# Patient Record
Sex: FEMALE | Race: WHITE | Employment: OTHER | URBAN - METROPOLITAN AREA
[De-identification: names, ages, dates, MRNs, and addresses within clinical notes are randomized per-mention and may not be internally consistent; named-entity substitution may affect disease eponyms.]

---

## 2017-01-05 ENCOUNTER — OFFICE VISIT (OUTPATIENT)
Dept: INTERNAL MEDICINE CLINIC | Age: 72
End: 2017-01-05

## 2017-01-05 VITALS
HEART RATE: 66 BPM | HEIGHT: 65 IN | RESPIRATION RATE: 12 BRPM | BODY MASS INDEX: 22.33 KG/M2 | OXYGEN SATURATION: 97 % | TEMPERATURE: 98.7 F | WEIGHT: 134 LBS

## 2017-01-05 DIAGNOSIS — M77.11 RIGHT TENNIS ELBOW: Primary | ICD-10-CM

## 2017-01-05 RX ORDER — IBUPROFEN 200 MG
400 TABLET ORAL
COMMUNITY
Start: 2017-01-05 | End: 2018-10-08 | Stop reason: ALTCHOICE

## 2017-01-05 NOTE — MR AVS SNAPSHOT
Visit Information Date & Time Provider Department Dept. Phone Encounter #  
 1/5/2017  4:00 PM Luis E Umaña MD [address] Internal Medicine [phone] [id number] Your Appointments 10/5/2017  1:30 PM  
Medicare Physical with Luis E Umaña MD  
[address] Internal [facility] Appt Note: CPE - wellness [address] [facility] [address] [place] [address] [facility] [address]  
[facility] [address] [facility] [address] Upcoming Health Maintenance Date Due COLONOSCOPY 1/1/2016 MEDICARE YEARLY EXAM 10/4/2017 GLAUCOMA SCREENING Q2Y 5/31/2018 BREAST CANCER SCRN MAMMOGRAM 11/21/2018 DTaP/Tdap/Td series (2 - Td) 7/18/2022 Allergies as of 1/5/2017  Review Complete On: 1/5/2017 By: Benjie Shearer LPN No Known Allergies Current Immunizations  Reviewed on 9/28/2015 Name Date Influenza High Dose Vaccine PF 10/17/2016, 9/28/2015, 9/30/2014 Influenza Vaccine Whole 11/1/2009 Pneumococcal Conjugate (PCV-13) 8/6/2015 Pneumococcal Vaccine (Unspecified Type) 6/3/2009 TDAP Vaccine 7/18/2012 Zoster 5/22/2007 Not reviewed this visit You Were Diagnosed With   
  
 Codes Comments Right tennis elbow    -  Primary ICD-10-CM: M77.11 ICD-9-CM: 726.32 Vitals Pulse Temp Resp Height(growth percentile) Weight(growth percentile) SpO2  
 66 98.7 °F (37.1 °C) (Oral) 12 5' 5\" (1.651 m) 134 lb (60.8 kg) 97% BMI OB Status Smoking Status 22.3 kg/m2 Hysterectomy Former Smoker Vitals History BMI and BSA Data Body Mass Index Body Surface Area  
 22.3 kg/m 2 1.67 m 2 Preferred Pharmacy Pharmacy Name Phone [facility] # [address] [facility] [phone] Your Updated Medication List  
  
   
This list is accurate as of: 1/5/17  4:46 PM.  Always use your most recent med list. ADVIL 200 mg tablet Generic drug:  ibuprofen Take 2 Tabs by mouth every eight (8) hours as needed for Pain. DHA ALGAL-900 PO Take  by mouth. fexofenadine 180 mg tablet Commonly known as:  Arty Most Take 1 Tab by mouth daily. metroNIDAZOLE 0.75 % topical cream  
Commonly known as:  METROCREAM  
Apply  to affected area two (2) times a day. Use a thin layer to affected areas after washing  
  
 valACYclovir 1 gram tablet Commonly known as:  VALTREX Take 1 Tab by mouth daily. TAKE 1 TABLET DAILY AS DIRECTED  Indications: GENITAL HERPES SIMPLEX  
  
 VITAMIN D3 1,000 unit Cap Generic drug:  cholecalciferol Take  by mouth. Patient Instructions Tennis Elbow: Care Instructions Your Care Instructions Tennis elbow is soreness or pain on the outer part of the elbow. The pain occurs when the tendon is stretched and becomes irritated by repeated twisting of the hand, wrist, and forearm. A tendon is a tough tissue that connects muscle to bone. This injury is common in tennis players. But you also can get it from many activities that work the same muscles. Examples include gardening, painting, and using tools. Tennis elbow usually heals with rest and treatment at home. Follow-up care is a key part of your treatment and safety. Be sure to make and go to all appointments, and call your doctor if you are having problems. It's also a good idea to know your test results and keep a list of the medicines you take. How can you care for yourself at home? · Rest your fingers, wrist, and forearm. Try to stop or reduce any activity that causes elbow pain. You may have to rest your arm for weeks to months. Follow your doctor's directions for how long to rest. 
· Put ice or a cold pack on your elbow for 10 to 20 minutes at a time. Try to do this every 1 to 2 hours for the next 3 days (when you are awake) or until the swelling goes down. Put a thin cloth between the ice and your skin. · If your doctor gave you a brace or splint, use it as directed. A \"counterforce\" brace is a strap around your forearm, just below your elbow. It may ease the pressure on the tendon and spread force throughout your arm. · Prop up your elbow on pillows to help reduce swelling. · Follow your doctor's or physical therapist's directions for exercise. · Return to your usual activities slowly. · Try to prevent the problem. Learn the best techniques for your sport. For example, make sure the  on your tennis racquet is not too big for your hand. Try not to hit a tennis ball late in your swing. · Think about asking your employer about new ways of doing your job if your elbow pain is caused by something you do at work. Medicines · Be safe with medicines. Read and follow all instructions on the label. ¨ If the doctor gave you a prescription medicine for pain, take it as prescribed. ¨ If you are not taking a prescription pain medicine, ask your doctor if you can take an over-the-counter medicine. When should you call for help? Call your doctor now or seek immediate medical care if: 
· Your pain is worse. · You cannot bend your elbow normally. · Your arm or hand is cool or pale or changes color. · You have tingling, weakness, or numbness in your hand and fingers. Watch closely for changes in your health, and be sure to contact your doctor if: 
· You have work problems caused by your elbow pain. · Your pain is not better after 2 weeks. Where can you learn more? Go to http://ludwin-leonard.info/. Enter 0699 [id number] in the search box to learn more about \"Tennis Elbow: Care Instructions. \" Current as of: May 23, 2016 Content Version: 11.1 © 0261-5985 [employer]. Care instructions adapted under license by [employer] (which disclaims liability or warranty for this information).  If you have questions about a medical condition or this instruction, always ask your healthcare professional. [place] [address] any warranty or liability for your use of this information. Tennis Elbow: Exercises Your Care Instructions Here are some examples of typical rehabilitation exercises for your condition. Start each exercise slowly. Ease off the exercise if you start to have pain. Your doctor or physical therapist will tell you when you can start these exercises and which ones will work best for you. How to do the exercises Wrist flexor stretch 1. Extend your arm in front of you with your palm up. 2. Bend your wrist, pointing your hand toward the floor. 3. With your other hand, gently bend your wrist farther until you feel a mild to moderate stretch in your forearm. 4. Hold for at least 15 to 30 seconds. Repeat 2 to 4 times. Wrist extensor stretch Repeat steps 1 to 4 of the stretch above but begin with your extended hand palm down. Ball or sock squeeze 1. Hold a tennis ball (or a rolled-up sock) in your hand. 2. Make a fist around the ball (or sock) and squeeze. 3. Hold for about 6 seconds, and then relax for up to 10 seconds. 4. Repeat 8 to 12 times. 5. Switch the ball (or sock) to your other hand and do 8 to 12 times. Wrist deviation 1. Sit so that your arm is supported but your hand hangs off the edge of a flat surface, such as a table. 2. Hold your hand out like you are shaking hands with someone. 3. Move your hand up and down. 4. Repeat this motion 8 to 12 times. 5. Switch arms. 6. Try to do this exercise twice with each hand. Wrist curls 1. Place your forearm on a table with your hand hanging over the edge of the table, palm up. 2. Place a 1- to 2-pound weight in your hand. This may be a dumbbell, a can of food, or a filled water bottle. 3. Slowly raise and lower the weight while keeping your forearm on the table and your palm facing up. 4. Repeat this motion 8 to 12 times. 5. Switch arms, and do steps 1 through 4. 
6. Repeat with your hand facing down toward the floor. Switch arms. Biceps curls 1. Sit leaning forward with your legs slightly spread and your left hand on your left thigh. 2. Place your right elbow on your right thigh, and hold the weight with your forearm horizontal. 
3. Slowly curl the weight up and toward your chest. 
4. Repeat this motion 8 to 12 times. 5. Switch arms, and do steps 1 through 4. Follow-up care is a key part of your treatment and safety. Be sure to make and go to all appointments, and call your doctor if you are having problems. It's also a good idea to know your test results and keep a list of the medicines you take. Where can you learn more? Go to http://ludwin-leonard.info/. Enter G917 in the search box to learn more about \"Tennis Elbow: Exercises. \" Current as of: May 23, 2016 Content Version: 11.1 © 2243-0438 [employer]. Care instructions adapted under license by [employer] (which disclaims liability or warranty for this information). If you have questions about a medical condition or this instruction, always ask your healthcare professional. [place] [address] any warranty or liability for your use of this information. Introducing hospitals & HEALTH SERVICES! Dear Maura De Guzman: Thank you for requesting a Ruckus account. Our records indicate that you already have an active Ruckus account. You can access your account anytime at [email or web address] Did you know that you can access your hospital and ER discharge instructions at any time in Ruckus? You can also review all of your test results from your hospital stay or ER visit. Additional Information If you have questions, please visit the Frequently Asked Questions section of the Ruckus website at [email or web address] Remember, Ruckus is NOT to be used for urgent needs.  For medical emergencies, dial 911. Now available from your iPhone and Android! Please provide this summary of care documentation to your next provider. Your primary care clinician is listed as Colin [phone] If you have any questions after today's visit, please call [phone].

## 2017-01-05 NOTE — PATIENT INSTRUCTIONS
Tennis Elbow: Care Instructions  Your Care Instructions  Tennis elbow is soreness or pain on the outer part of the elbow. The pain occurs when the tendon is stretched and becomes irritated by repeated twisting of the hand, wrist, and forearm. A tendon is a tough tissue that connects muscle to bone. This injury is common in tennis players. But you also can get it from many activities that work the same muscles. Examples include gardening, painting, and using tools. Tennis elbow usually heals with rest and treatment at home. Follow-up care is a key part of your treatment and safety. Be sure to make and go to all appointments, and call your doctor if you are having problems. It's also a good idea to know your test results and keep a list of the medicines you take. How can you care for yourself at home? · Rest your fingers, wrist, and forearm. Try to stop or reduce any activity that causes elbow pain. You may have to rest your arm for weeks to months. Follow your doctor's directions for how long to rest.  · Put ice or a cold pack on your elbow for 10 to 20 minutes at a time. Try to do this every 1 to 2 hours for the next 3 days (when you are awake) or until the swelling goes down. Put a thin cloth between the ice and your skin. · If your doctor gave you a brace or splint, use it as directed. A \"counterforce\" brace is a strap around your forearm, just below your elbow. It may ease the pressure on the tendon and spread force throughout your arm. · Prop up your elbow on pillows to help reduce swelling. · Follow your doctor's or physical therapist's directions for exercise. · Return to your usual activities slowly. · Try to prevent the problem. Learn the best techniques for your sport. For example, make sure the  on your tennis racquet is not too big for your hand. Try not to hit a tennis ball late in your swing.   · Think about asking your employer about new ways of doing your job if your elbow pain is caused by something you do at work. Medicines  · Be safe with medicines. Read and follow all instructions on the label. ¨ If the doctor gave you a prescription medicine for pain, take it as prescribed. ¨ If you are not taking a prescription pain medicine, ask your doctor if you can take an over-the-counter medicine. When should you call for help? Call your doctor now or seek immediate medical care if:  · Your pain is worse. · You cannot bend your elbow normally. · Your arm or hand is cool or pale or changes color. · You have tingling, weakness, or numbness in your hand and fingers. Watch closely for changes in your health, and be sure to contact your doctor if:  · You have work problems caused by your elbow pain. · Your pain is not better after 2 weeks. Where can you learn more? Go to http://ludwin-leonard.info/. Enter [id number] [id number] in the search box to learn more about \"Tennis Elbow: Care Instructions. \"  Current as of: May 23, 2016  Content Version: 11.1  © 9461-6548 M:Metrics. Care instructions adapted under license by [employer] (which disclaims liability or warranty for this information). If you have questions about a medical condition or this instruction, always ask your healthcare professional. [address] any warranty or liability for your use of this information. Tennis Elbow: Exercises  Your Care Instructions  Here are some examples of typical rehabilitation exercises for your condition. Start each exercise slowly. Ease off the exercise if you start to have pain. Your doctor or physical therapist will tell you when you can start these exercises and which ones will work best for you. How to do the exercises  Wrist flexor stretch    1. Extend your arm in front of you with your palm up. 2. Bend your wrist, pointing your hand toward the floor.   3. With your other hand, gently bend your wrist farther until you feel a mild to moderate stretch in your forearm. 4. Hold for at least 15 to 30 seconds. Repeat 2 to 4 times. Wrist extensor stretch    Repeat steps 1 to 4 of the stretch above but begin with your extended hand palm down. Ball or sock squeeze    1. Hold a tennis ball (or a rolled-up sock) in your hand. 2. Make a fist around the ball (or sock) and squeeze. 3. Hold for about 6 seconds, and then relax for up to 10 seconds. 4. Repeat 8 to 12 times. 5. Switch the ball (or sock) to your other hand and do 8 to 12 times. Wrist deviation    1. Sit so that your arm is supported but your hand hangs off the edge of a flat surface, such as a table. 2. Hold your hand out like you are shaking hands with someone. 3. Move your hand up and down. 4. Repeat this motion 8 to 12 times. 5. Switch arms. 6. Try to do this exercise twice with each hand. Wrist curls    1. Place your forearm on a table with your hand hanging over the edge of the table, palm up. 2. Place a 1- to 2-pound weight in your hand. This may be a dumbbell, a can of food, or a filled water bottle. 3. Slowly raise and lower the weight while keeping your forearm on the table and your palm facing up. 4. Repeat this motion 8 to 12 times. 5. Switch arms, and do steps 1 through 4.  6. Repeat with your hand facing down toward the floor. Switch arms. Biceps curls    1. Sit leaning forward with your legs slightly spread and your left hand on your left thigh. 2. Place your right elbow on your right thigh, and hold the weight with your forearm horizontal.  3. Slowly curl the weight up and toward your chest.  4. Repeat this motion 8 to 12 times. 5. Switch arms, and do steps 1 through 4. Follow-up care is a key part of your treatment and safety. Be sure to make and go to all appointments, and call your doctor if you are having problems. It's also a good idea to know your test results and keep a list of the medicines you take. Where can you learn more?   Go to http://ludwin-leonard.info/. Enter S273 in the search box to learn more about \"Tennis Elbow: Exercises. \"  Current as of: May 23, 2016  Content Version: 11.1  © 1262-9595 [employer]em [employer]. Care instructions adapted under license by [employer] (which disclaims liability or warranty for this information). If you have questions about a medical condition or this instruction, always ask your healthcare professional. [place] [address] any warranty or liability for your use of this information.

## 2017-01-06 NOTE — PROGRESS NOTES
HISTORY OF PRESENT ILLNESS  Jordyn Flores is a 66 y.o. female. HPI  Presents for 5 weeks of pain in the right elbow. Has been doing a lot of yard work and blowing leaves. Has taken one advil per day and had ordered a stretching bar. No swelling or redness. No numbness or tingling or weakness. No other joint aches. Past Medical History   Diagnosis Date    Cancer [facility]) '78 & '04     melanoma times 2 - RESECTED    Endometriosis     HSV-2 infection      Past Surgical History   Procedure Laterality Date    Hx hysterectomy      Pr manipulatn shldr jt w anesthesia      Xr dexa bone density study axial  04.2009    Hx orthopaedic       FROZEN SHOULDER    Hx orthopaedic  10/2012     Foot surgery     Current Outpatient Prescriptions on File Prior to Visit   Medication Sig Dispense Refill    valACYclovir (VALTREX) 1 gram tablet Take 1 Tab by mouth daily. TAKE 1 TABLET DAILY AS DIRECTED  Indications: GENITAL HERPES SIMPLEX 60 Tab 0    Cholecalciferol, Vitamin D3, (VITAMIN D3) 1,000 unit cap Take  by mouth.  DOCOSAHEXANOIC ACID (DHA ALGAL-900 PO) Take  by mouth.  fexofenadine (ALLEGRA) 180 mg tablet Take 1 Tab by mouth daily. 90 Tab 3    metronidazole (METROCREAM) 0.75 % topical cream Apply  to affected area two (2) times a day. Use a thin layer to affected areas after washing        No current facility-administered medications on file prior to visit. ROS  Per HPI  Physical Exam  Physical Examination: General appearance - alert, well appearing, and in no distress  Chest - clear to auscultation, no wheezes, rales or rhonchi, symmetric air entry  Heart - normal rate, regular rhythm, normal S1, S2, no murmurs, rubs, clicks or gallops  Musculoskeletal - abnormal exam of right elbow with tenderness over the lateral epicondyle. Normal ROM and no redness or warmth.    Extremities - peripheral pulses normal, no pedal edema, no clubbing or cyanosis    ASSESSMENT and PLAN  Tennis elbow - will use more advil as well as stretches and a brace and let me know if not improving. Plan -   Orders Placed This Encounter    ibuprofen (ADVIL) 200 mg tablet     Sig: Take 2 Tabs by mouth every eight (8) hours as needed for Pain. Advised her to call back or return to office if symptoms worsen/change/persist.  Discussed expected course/resolution/complications of diagnosis in detail with patient. Medication risks/benefits/costs/interactions/alternatives discussed with patient. She was given an after visit summary which includes diagnoses, current medications, & vitals. She expressed understanding with the diagnosis and plan.

## 2017-02-14 RX ORDER — VALACYCLOVIR HYDROCHLORIDE 1 G/1
1000 TABLET, FILM COATED ORAL DAILY
Qty: 90 TAB | Refills: 1 | Status: SHIPPED | OUTPATIENT
Start: 2017-02-14 | End: 2018-07-23 | Stop reason: SDUPTHER

## 2017-02-14 NOTE — TELEPHONE ENCOUNTER
Orders Placed This Encounter    valACYclovir (VALTREX) 1 gram tablet     Sig: Take 1 Tab by mouth daily. TAKE 1 TABLET DAILY AS DIRECTED  Indications: GENITAL HERPES SIMPLEX     Dispense:  90 Tab     Refill:  1     The above medication refills were approved via verbal order by Dr. Dylan Yanez III.

## 2017-07-11 ENCOUNTER — TELEPHONE (OUTPATIENT)
Dept: INTERNAL MEDICINE CLINIC | Age: 72
End: 2017-07-11

## 2017-07-11 DIAGNOSIS — Z12.11 COLON CANCER SCREENING: Primary | ICD-10-CM

## 2017-07-11 NOTE — TELEPHONE ENCOUNTER
Referral to [facility] for colo cancer screening - however ins. Will not show [employer] on referral because we do not have her new insurance information on file with our office. Will not effect scheduling as they will obtain copies of her insurance cards when she sees them. Referral faxed to Dr. [facility] office with confirmation [facility]. Attempted to reach patient, unsuccessful. Left message to return call.

## 2017-07-11 NOTE — TELEPHONE ENCOUNTER
Patient was given a referral to gastro for a colonoscopy last October. She never had that done and states she was never contacted to schedule it. She would like a new order with current date and that shows her current insurance, which is [employer]. Please let her know if she should come by to pick that up or if you will mail it to her. Also, she needs to know if someone will contact her from there.

## 2017-10-05 ENCOUNTER — OFFICE VISIT (OUTPATIENT)
Dept: INTERNAL MEDICINE CLINIC | Age: 72
End: 2017-10-05

## 2017-10-05 ENCOUNTER — HOSPITAL ENCOUNTER (OUTPATIENT)
Dept: GENERAL RADIOLOGY | Age: 72
Discharge: HOME | End: 2017-10-05
Payer: COMMERCIAL

## 2017-10-05 VITALS
DIASTOLIC BLOOD PRESSURE: 78 MMHG | WEIGHT: 128.4 LBS | OXYGEN SATURATION: 99 % | BODY MASS INDEX: 21.39 KG/M2 | TEMPERATURE: 98 F | SYSTOLIC BLOOD PRESSURE: 140 MMHG | HEART RATE: 73 BPM | RESPIRATION RATE: 18 BRPM | HEIGHT: 65 IN

## 2017-10-05 DIAGNOSIS — E55.9 VITAMIN D DEFICIENCY: ICD-10-CM

## 2017-10-05 DIAGNOSIS — E78.2 MIXED HYPERLIPIDEMIA: ICD-10-CM

## 2017-10-05 DIAGNOSIS — Z71.89 ADVANCED CARE PLANNING/COUNSELING DISCUSSION: ICD-10-CM

## 2017-10-05 DIAGNOSIS — M16.0 BILATERAL HIP JOINT ARTHRITIS: ICD-10-CM

## 2017-10-05 DIAGNOSIS — Z00.00 MEDICARE ANNUAL WELLNESS VISIT, SUBSEQUENT: Primary | ICD-10-CM

## 2017-10-05 DIAGNOSIS — Z13.31 SCREENING FOR DEPRESSION: ICD-10-CM

## 2017-10-05 DIAGNOSIS — Z13.39 SCREENING FOR ALCOHOLISM: ICD-10-CM

## 2017-10-05 DIAGNOSIS — M16.0 PRIMARY OSTEOARTHRITIS OF BOTH HIPS: ICD-10-CM

## 2017-10-05 PROCEDURE — 73521 X-RAY EXAM HIPS BI 2 VIEWS: CPT

## 2017-10-05 NOTE — PROGRESS NOTES
HPI:  Wm Proctor is a 74y.o. year old female who is here for a routine visit:    Here for her Medicare wellness as well as her routine follow-up visit. She has been feeling reasonably well. She has has noted some increasing pain in her hips particularly in the posterior buttocks and in the groin. Seem to be most notable when she is standing on her feet or turning her legs. She is also had some soreness in her shoulders and decreased range of motion as well. She also had questions about whether she should get another shingles vaccine. Her last shingles vaccine was given when she was taking Valtrex. She is otherwise seeing her gynecologist regularly and getting mammograms done regularly as well. Patient was seen in consultation with the nurse navigator history was reviewed and agree with the assessment of the nurse navigator. Past Medical History:   Diagnosis Date    Cancer [facility]) '66 & '04    melanoma times 2 - RESECTED    Endometriosis     HSV-2 infection        Past Surgical History:   Procedure Laterality Date    HX HYSTERECTOMY      HX ORTHOPAEDIC      FROZEN SHOULDER    HX ORTHOPAEDIC  10/2012    Foot surgery    HI BETTYULAJUDY RAMIREZ JT W ANESTHESIA      XR DEXA BONE DENSITY STUDY AXIAL  04.2009       Prior to Admission medications    Medication Sig Start Date End Date Taking? Authorizing Provider   TURMERIC, BULK, by Does Not Apply route. Yes Historical Provider   valACYclovir (VALTREX) 1 gram tablet Take 1 Tab by mouth daily. TAKE 1 TABLET DAILY AS DIRECTED  Indications: GENITAL HERPES SIMPLEX  Patient taking differently: Take 500 mg by mouth daily. TAKE 1 TABLET DAILY AS DIRECTED  Indications: genital herpes simplex 2/14/17  Yes Brandi Case III, MD   Cholecalciferol, Vitamin D3, (VITAMIN D3) 1,000 unit cap Take  by mouth. Yes Historical Provider   fexofenadine (ALLEGRA) 180 mg tablet Take 1 Tab by mouth daily.  7/13/11  Yes Johnathan Figueroa MD   metronidazole (METROCREAM) 0.75 % topical cream Apply  to affected area two (2) times a day. Use a thin layer to affected areas after washing    Yes Historical Provider   ibuprofen (ADVIL) 200 mg tablet Take 2 Tabs by mouth every eight (8) hours as needed for Pain. 1/5/17   Kevin Busch III, MD   DOCOSAHEXANOIC ACID (DHA ALGAL-900 PO) Take  by mouth. Historical Provider       Social History     Social History    Marital status: UNKNOWN     Spouse name: N/A    Number of children: N/A    Years of education: N/A     Occupational History    Not on file.      Social History Main Topics    Smoking status: Former Smoker     Packs/day: 0.25     Years: 11.00     Quit date: 2/26/1973    Smokeless tobacco: Never Used    Alcohol use No    Drug use: No    Sexual activity: Not on file     Other Topics Concern    Not on file     Social History Narrative          ROS  Per HPI    Visit Vitals    /78 (BP 1 Location: Left arm, BP Patient Position: Sitting)    Pulse 73    Temp 98 °F (36.7 °C) (Oral)    Resp 18    Ht 5' 4.5\" (1.638 m)    Wt 128 lb 6.4 oz (58.2 kg)    SpO2 99%    BMI 21.7 kg/m2         Physical Exam   Physical Examination: General appearance - alert, well appearing, and in no distress  Eyes - pupils equal and reactive, extraocular eye movements intact  Ears - bilateral TM's and external ear canals normal  Nose - normal and patent, no erythema, discharge or polyps  Mouth - mucous membranes moist, pharynx normal without lesions  Neck - supple, no significant adenopathy  Lymphatics - no palpable lymphadenopathy, no hepatosplenomegaly  Chest - clear to auscultation, no wheezes, rales or rhonchi, symmetric air entry  Heart - normal rate, regular rhythm, normal S1, S2, no murmurs, rubs, clicks or gallops  Abdomen - soft, nontender, nondistended, no masses or organomegaly  Back exam - full range of motion, no tenderness, palpable spasm or pain on motion  Neurological - alert, oriented, normal speech, no focal findings or movement disorder noted  Musculoskeletal - no joint tenderness, deformity or swelling, there is painful range of motion in the hips right greater than left particularly with external rotation. No effusion or erythema. Negative straight leg raising. Extremities - peripheral pulses normal, no pedal edema, no clubbing or cyanosis      Assessment/Plan:  Diagnoses and all orders for this visit:    1. Medicare annual wellness visit, subsequent    2. Advanced care planning/counseling discussion    3. Screening for depression    4. Screening for alcoholism    5. Mixed hyperlipidemia continue to work on diet and exercise and recheck her lipids. -     CBC WITH AUTOMATED DIFF  -     LIPID PANEL  -     METABOLIC PANEL, COMPREHENSIVE  -     TSH RFX ON ABNORMAL TO FREE T4  -     UA/M W/RFLX CULTURE, ROUTINE    6. Vitamin D deficiency check vitamin D level to be sure this is repleted. Continue her current supplement. -     VITAMIN D, 25 HYDROXY    7. Bilateral hip joint arthritis likely related to osteoarthritis in the hips. Will check x-rays and consider an orthopedic evaluation pending those results. -     XR HIP LT W OR WO PELV 2-3 VWS; Future  -     XR HIP RT W OR WO PELV 2-3 VWS; Future       Follow-up Disposition: 12 months and as needed. Advised her to call back or return to office if symptoms worsen/change/persist.  Discussed expected course/resolution/complications of diagnosis in detail with patient. Medication risks/benefits/costs/interactions/alternatives discussed with patient. She was given an after visit summary which includes diagnoses, current medications, & vitals. She expressed understanding with the diagnosis and plan.

## 2017-10-05 NOTE — PATIENT INSTRUCTIONS
Today you had a Medicare Wellness Visit. During this visit, we developed and/or updated your personalized health plan to prevent disease and disability based on your current health and risk factors. Please schedule an appt around this time next year so we can continue to keep you on the right path to living a healthy lifestyle. Schedule of Personalized Health Plan    The best way to stay healthy is to live a healthy lifestyle. A healthy lifestyle includes regular exercise, eating a well-balanced diet, keeping a healthy weight and not smoking. Regular physical exams and screening tests are another important way to take care of yourself. Preventive exams provided by health care providers can find health problems early when treatment works best and can keep you from getting certain diseases or illnesses. Preventive services include exams, lab tests, screenings, shots, monitoring and information to help you take care of your own health. All people over 65 should have a pneumonia shot. Pneumonia shots are usually only needed once in a lifetime unless your doctor decides differently. All people over 65 should have a yearly flu shot. People over 65 are at medium to high risk for Hepatitis B. Three shots are needed for complete protection. For additional information, please discuss with physician. In addition to your physical exam, some screening tests are recommended:    Bone mass measurement (dexa scan) is recommended every  two years. Diabetes Mellitus screening is recommended every year. Glaucoma is an eye disease caused by high pressure in the eye. An eye exam is recommended every year. Cardiovascular screening tests that check your cholesterol and other blood fat (lipid) levels are recommended every five years. Colorectal Cancer screening tests help to find pre-cancerous polyps (growths in the colon) so they can be removed before they turn into cancer.   Tests ordered for screening depend on your personal and family history risk factors. Mammogram screening for Breast Cancer is recommended yearly. Screening for Cervical Cancer is recommended every two years (annually for certain risk factors, such as previous history of STD or abnormal PAP in past 7 years). Here is a list of your current Health Maintenance items with a due date:  Health Maintenance   Topic Date Due    COLONOSCOPY  01/01/2016    INFLUENZA AGE 9 TO ADULT  08/01/2017    GLAUCOMA SCREENING Q2Y  05/31/2018    MEDICARE YEARLY EXAM  10/06/2018    DTaP/Tdap/Td series (2 - Td) 07/18/2022    OSTEOPOROSIS SCREENING (DEXA)  Completed    ZOSTER VACCINE AGE 60>  Completed    Pneumococcal 65+ High/Highest Risk  Completed            Frozen Shoulder: Exercises  Your Care Instructions  Here are some examples of typical rehabilitation exercises for your condition. Start each exercise slowly. Ease off the exercise if you start to have pain. Your doctor or physical therapist will tell you when you can start these exercises and which ones will work best for you. How to do the exercises  Neck stretches    1. Look straight ahead, and tip your right ear to your right shoulder. Do not let your left shoulder rise up as you tip your head to the right. 2. Hold 15 to 30 seconds. 3. Tilt your head to the left. Do not let your right shoulder rise up as you tip your head to the left. 4. Hold for 15 to 30 seconds. 5. Repeat 2 to 4 times to each side. Shoulder rolls    1. Sit comfortably with your feet shoulder-width apart. You can also do this exercise while standing. 2. Roll your shoulders up, then back, and then down in a smooth, circular motion. 3. Repeat 2 to 4 times. Shoulder flexion (lying down)    Note: For this exercise, you will need a wand. To make a wand, use a piece of PVC pipe or a broom handle with the broom removed. Make the wand about a foot wider than your shoulders.   1. Lie on your back, holding a wand with your hands. Your palms should face down as you hold the wand. Place your hands slightly wider than your shoulders. 2. Keeping your elbows straight, slowly raise your arms over your head until you feel a stretch in your shoulders, upper back, and chest.  3. Hold 15 to 30 seconds. 4. Repeat 2 to 4 times. Shoulder rotation (lying down)    Note: To make a wand, use a piece of PVC pipe or a broom handle with the broom removed. Make the wand about a foot wider than your shoulders. 1. Lie on your back and hold a wand in both hands with your elbows bent and your palms up. 2. Keeping your elbows close to your body, move the wand across your body toward the arm that has pain. 3. Hold for 15 to 30 seconds. 4. Repeat 2 to 4 times. Shoulder internal rotation with towel    1. Roll up a towel lengthwise. Hold the towel above and behind your head with the arm that is not sore. 2. With your sore arm, reach behind your back and grasp the towel. 3. Using the arm above your head, pull the towel upward until you feel a stretch on the front and outside of your sore shoulder. 4. Hold 15 to 30 seconds. 5. Relax and move the towel back down to the starting position. 6. Repeat 2 to 4 times. Shoulder blade squeeze    1. While standing with your arms at your sides, squeeze your shoulder blades together. Do not raise your shoulders up as you are squeezing. 2. Hold for 6 seconds. 3. Repeat 8 to 12 times. Follow-up care is a key part of your treatment and safety. Be sure to make and go to all appointments, and call your doctor if you are having problems. It's also a good idea to know your test results and keep a list of the medicines you take. Where can you learn more? Go to http://ludwin-leonard.info/. Enter R144 in the search box to learn more about \"Frozen Shoulder: Exercises. \"  Current as of: March 21, 2017  Content Version: 11.3  © 5422-7956 [employer].  Care instructions adapted under license by [address] (which disclaims liability or warranty for this information). If you have questions about a medical condition or this instruction, always ask your healthcare professional. [address] any warranty or liability for your use of this information. Hip Arthritis: Exercises  Your Care Instructions  Here are some examples of exercises for hip arthritis. Start each exercise slowly. Ease off the exercise if you start to have pain. Your doctor or physical therapist will tell you when you can start these exercises and which ones will work best for you. How to do the exercises  Straight-leg raises to the outside    6. Lie on your side, with your affected hip on top. 7. Tighten the front thigh muscles of your top leg to keep your knee straight. 8. Keep your hip and your leg straight in line with the rest of your body, and keep your knee pointing forward. Do not drop your hip back. 9. Lift your top leg straight up toward the ceiling, about 12 inches off the floor. Hold for about 6 seconds, then slowly lower your leg. 10. Repeat 8 to 12 times. 11. Switch legs and repeat steps 1 through 5, even if only one hip is sore. Straight-leg raises to the inside    4. Lie on your side with your affected hip on the floor. 5. You can either prop up your other leg on a chair, or you can bend that knee and put that foot in front of your other knee. Do not drop your hip back. 6. Tighten the muscles on the front thigh of your bottom leg to straighten that knee. 7. Keep your kneecap pointing forward and your leg straight, and lift your bottom leg up toward the ceiling about 6 inches. Hold for about 6 seconds, then lower slowly. 8. Repeat 8 to 12 times. 9. Switch legs and repeat steps 1 through 5, even if only one hip is sore. Hip hike    5. Stand sideways on the bottom step of a staircase, and hold on to the banister or wall.   6. Keeping both knees straight, lift your good leg off the step and let it hang down. Then hike your good hip up to the same level as your affected hip or a little higher. 7. Repeat 8 to 12 times. 8. Switch legs and repeat steps 1 through 3, even if only one hip is sore. Bridging    5. Lie on your back with both knees bent. Your knees should be bent about 90 degrees. 6. Then push your feet into the floor, squeeze your buttocks, and lift your hips off the floor until your shoulders, hips, and knees are all in a straight line. 7. Hold for about 6 seconds as you continue to breathe normally, and then slowly lower your hips back down to the floor and rest for up to 10 seconds. 8. Repeat 8 to 12 times. Hamstring stretch (lying down)    7. Lie flat on your back with your legs straight. If you feel discomfort in your back, place a small towel roll under your lower back. 8. Holding the back of your affected leg, lift your leg straight up and toward your body until you feel a stretch at the back of your thigh. 9. Hold the stretch for at least 30 seconds. 10. Repeat 2 to 4 times. 11. Switch legs and repeat steps 1 through 4, even if only one hip is sore. Standing quadriceps stretch    4. If you are not steady on your feet, hold on to a chair, counter, or wall. You can also lie on your stomach or your side to do this exercise. 5. Bend the knee of the leg you want to stretch, and reach behind you to grab the front of your foot or ankle with the hand on the same side. For example, if you are stretching your right leg, use your right hand. 6. Keeping your knees next to each other, pull your foot toward your buttock until you feel a gentle stretch across the front of your hip and down the front of your thigh. Your knee should be pointed directly to the ground, and not out to the side. 7. Hold the stretch for at least 15 to 30 seconds. 8. Repeat 2 to 4 times. 9. Switch legs and repeat steps 1 through 5, even if only one hip is sore.   Hip rotator stretch    1. Lie on your back with both knees bent and your feet flat on the floor. 2. Put the ankle of your affected leg on your opposite thigh near your knee. 3. Use your hand to gently push your knee away from your body until you feel a gentle stretch around your hip. 4. Hold the stretch for 15 to 30 seconds. 5. Repeat 2 to 4 times. 6. Repeat steps 1 through 5, but this time use your hand to gently pull your knee toward your opposite shoulder. 7. Switch legs and repeat steps 1 through 6, even if only one hip is sore. Knee-to-chest    1. Lie on your back with your knees bent and your feet flat on the floor. 2. Bring your affected leg to your chest, keeping the other foot flat on the floor (or keeping the other leg straight, whichever feels better on your lower back). 3. Keep your lower back pressed to the floor. Hold for at least 15 to 30 seconds. 4. Relax, and lower the knee to the starting position. 5. Repeat 2 to 4 times. 6. Switch legs and repeat steps 1 through 5, even if only one hip is sore. 7. To get more stretch, put your other leg flat on the floor while pulling your knee to your chest.  Clamshell    1. Lie on your side, with your affected hip on top. Keep your feet and knees together and your knees bent. 2. Raise your top knee, but keep your feet together. Do not let your hips roll back. Your legs should open up like a clamshell. 3. Hold for 6 seconds. 4. Slowly lower your knee back down. Rest for 10 seconds. 5. Repeat 8 to 12 times. 6. Switch legs and repeat steps 1 through 5, even if only one hip is sore. Follow-up care is a key part of your treatment and safety. Be sure to make and go to all appointments, and call your doctor if you are having problems. It's also a good idea to know your test results and keep a list of the medicines you take. Where can you learn more? Go to http://ludwin-leonard.info/.   Enter V519 in the search box to learn more about \"Hip Arthritis: Exercises. \"  Current as of: March 21, 2017  Content Version: 11.3  © 6550-5278 [employer]. Care instructions adapted under license by [employer] (which disclaims liability or warranty for this information). If you have questions about a medical condition or this instruction, always ask your healthcare professional. [place] [address] any warranty or liability for your use of this information.

## 2017-10-05 NOTE — MR AVS SNAPSHOT
Visit Information Date & Time Provider Department Dept. Phone Encounter #  
 10/5/2017  1:30 PM Michael Do MD [facility] Internal Medicine [id number] Upcoming Health Maintenance Date Due COLONOSCOPY 1/1/2016 INFLUENZA AGE 9 TO ADULT 8/1/2017 GLAUCOMA SCREENING Q2Y 5/31/2018 MEDICARE YEARLY EXAM 10/6/2018 DTaP/Tdap/Td series (2 - Td) 7/18/2022 Allergies as of 10/5/2017  Review Complete On: 10/5/2017 By: Michael Do MD  
 No Known Allergies Current Immunizations  Reviewed on 10/5/2017 Name Date Influenza High Dose Vaccine PF 10/17/2016, 9/28/2015, 9/30/2014 Influenza Vaccine Whole 11/1/2009 Pneumococcal Conjugate (PCV-13) 8/6/2015 TDAP Vaccine 7/18/2012 ZZZ-RETIRED (DO NOT USE) Pneumococcal Vaccine (Unspecified Type) 6/3/2009 Zoster 5/22/2007 Reviewed by Michael Do MD on 10/5/2017 at  2:07 PM  
 Reviewed by Michael Do MD on 10/5/2017 at  2:11 PM  
You Were Diagnosed With   
  
 Codes Comments Medicare annual wellness visit, subsequent    -  Primary ICD-10-CM: Z00.00 ICD-9-CM: V70.0 Advanced care planning/counseling discussion     ICD-10-CM: Z71.89 ICD-9-CM: V65.49 Screening for depression     ICD-10-CM: Z13.89 ICD-9-CM: V79.0 Screening for alcoholism     ICD-10-CM: Z13.89 ICD-9-CM: V79.1 Mixed hyperlipidemia     ICD-10-CM: E78.2 ICD-9-CM: 272.2 Vitamin D deficiency     ICD-10-CM: E55.9 ICD-9-CM: 268.9 Bilateral hip joint arthritis     ICD-10-CM: M16.0 ICD-9-CM: 716.95 Vitals BP Pulse Temp Resp Height(growth percentile) Weight(growth percentile) 140/78 (BP 1 Location: Left arm, BP Patient Position: Sitting) 73 98 °F (36.7 °C) (Oral) 18 5' 4.5\" (1.638 m) 128 lb 6.4 oz (58.2 kg) SpO2 BMI OB Status Smoking Status 99% 21.7 kg/m2 Hysterectomy Former Smoker Vitals History BMI and BSA Data Body Mass Index Body Surface Area 21.7 kg/m 2 1.63 m 2 Preferred Pharmacy Pharmacy Name Phone [id number] - [address] [phone] Your Updated Medication List  
  
   
This list is accurate as of: 10/5/17  2:28 PM.  Always use your most recent med list. ADVIL 200 mg tablet Generic drug:  ibuprofen Take 2 Tabs by mouth every eight (8) hours as needed for Pain. DHA ALGAL-900 PO Take  by mouth. fexofenadine 180 mg tablet Commonly known as:  Perla Holstein Take 1 Tab by mouth daily. metroNIDAZOLE 0.75 % topical cream  
Commonly known as:  METROCREAM  
Apply  to affected area two (2) times a day. Use a thin layer to affected areas after washing TURMERIC (BULK)  
by Does Not Apply route. valACYclovir 1 gram tablet Commonly known as:  VALTREX Take 1 Tab by mouth daily. TAKE 1 TABLET DAILY AS DIRECTED  Indications: GENITAL HERPES SIMPLEX  
  
 VITAMIN D3 1,000 unit Cap Generic drug:  cholecalciferol Take  by mouth. We Performed the Following CBC WITH AUTOMATED DIFF [91091 CPT(R)] LIPID PANEL [29245 CPT(R)] METABOLIC PANEL, COMPREHENSIVE [20119 CPT(R)] TSH RFX ON ABNORMAL TO FREE T4 [[id number] Custom] UA/M W/RFLX CULTURE, ROUTINE [[id number] Custom] VITAMIN D, 25 HYDROXY [id number] CPT(R)] To-Do List   
 10/05/2017 Imaging:  XR HIP LT W OR WO PELV 2-3 VWS   
  
 10/05/2017 Imaging:  XR HIP RT W OR WO PELV 2-3 VWS Patient Instructions Today you had a Medicare Wellness Visit. During this visit, we developed and/or updated your personalized health plan to prevent disease and disability based on your current health and risk factors. Please schedule an appt around this time next year so we can continue to keep you on the right path to living a healthy lifestyle. Schedule of Personalized Health Plan The best way to stay healthy is to live a healthy lifestyle.  A healthy lifestyle includes regular exercise, eating a well-balanced diet, keeping a healthy weight and not smoking. Regular physical exams and screening tests are another important way to take care of yourself. Preventive exams provided by health care providers can find health problems early when treatment works best and can keep you from getting certain diseases or illnesses. Preventive services include exams, lab tests, screenings, shots, monitoring and information to help you take care of your own health. All people over 65 should have a pneumonia shot. Pneumonia shots are usually only needed once in a lifetime unless your doctor decides differently. All people over 65 should have a yearly flu shot. People over 65 are at medium to high risk for Hepatitis B. Three shots are needed for complete protection. For additional information, please discuss with physician. In addition to your physical exam, some screening tests are recommended: 
 
Bone mass measurement (dexa scan) is recommended every  two years. Diabetes Mellitus screening is recommended every year. Glaucoma is an eye disease caused by high pressure in the eye. An eye exam is recommended every year. Cardiovascular screening tests that check your cholesterol and other blood fat (lipid) levels are recommended every five years. Colorectal Cancer screening tests help to find pre-cancerous polyps (growths in the colon) so they can be removed before they turn into cancer. Tests ordered for screening depend on your personal and family history risk factors. Mammogram screening for Breast Cancer is recommended yearly. Screening for Cervical Cancer is recommended every two years (annually for certain risk factors, such as previous history of STD or abnormal PAP in past 7 years). Here is a list of your current Health Maintenance items with a due date: 
Health Maintenance Topic Date Due  
 COLONOSCOPY  01/01/2016  INFLUENZA AGE 9 TO ADULT  08/01/2017  GLAUCOMA SCREENING Q2Y  05/31/2018  MEDICARE YEARLY EXAM  10/06/2018  DTaP/Tdap/Td series (2 - Td) 07/18/2022  
 OSTEOPOROSIS SCREENING (DEXA)  Completed  ZOSTER VACCINE AGE 60>  Completed  Pneumococcal 65+ High/Highest Risk  Completed Frozen Shoulder: Exercises Your Care Instructions Here are some examples of typical rehabilitation exercises for your condition. Start each exercise slowly. Ease off the exercise if you start to have pain. Your doctor or physical therapist will tell you when you can start these exercises and which ones will work best for you. How to do the exercises Neck stretches 1. Look straight ahead, and tip your right ear to your right shoulder. Do not let your left shoulder rise up as you tip your head to the right. 2. Hold 15 to 30 seconds. 3. Tilt your head to the left. Do not let your right shoulder rise up as you tip your head to the left. 4. Hold for 15 to 30 seconds. 5. Repeat 2 to 4 times to each side. Shoulder rolls 1. Sit comfortably with your feet shoulder-width apart. You can also do this exercise while standing. 2. Roll your shoulders up, then back, and then down in a smooth, circular motion. 3. Repeat 2 to 4 times. Shoulder flexion (lying down) Note: For this exercise, you will need a wand. To make a wand, use a piece of PVC pipe or a broom handle with the broom removed. Make the wand about a foot wider than your shoulders. 1. Lie on your back, holding a wand with your hands. Your palms should face down as you hold the wand. Place your hands slightly wider than your shoulders. 2. Keeping your elbows straight, slowly raise your arms over your head until you feel a stretch in your shoulders, upper back, and chest. 
3. Hold 15 to 30 seconds. 4. Repeat 2 to 4 times. Shoulder rotation (lying down) Note: To make a wand, use a piece of PVC pipe or a broom handle with the broom removed. Make the wand about a foot wider than your shoulders. 1. Lie on your back and hold a wand in both hands with your elbows bent and your palms up. 2. Keeping your elbows close to your body, move the wand across your body toward the arm that has pain. 3. Hold for 15 to 30 seconds. 4. Repeat 2 to 4 times. Shoulder internal rotation with towel 1. Roll up a towel lengthwise. Hold the towel above and behind your head with the arm that is not sore. 2. With your sore arm, reach behind your back and grasp the towel. 3. Using the arm above your head, pull the towel upward until you feel a stretch on the front and outside of your sore shoulder. 4. Hold 15 to 30 seconds. 5. Relax and move the towel back down to the starting position. 6. Repeat 2 to 4 times. Shoulder blade squeeze 1. While standing with your arms at your sides, squeeze your shoulder blades together. Do not raise your shoulders up as you are squeezing. 2. Hold for 6 seconds. 3. Repeat 8 to 12 times. Follow-up care is a key part of your treatment and safety. Be sure to make and go to all appointments, and call your doctor if you are having problems. It's also a good idea to know your test results and keep a list of the medicines you take. Where can you learn more? Go to http://ludwin-leonard.info/. Enter R144 in the search box to learn more about \"Frozen Shoulder: Exercises. \" Current as of: March 21, 2017 Content Version: 11.3 © 1218-8347 [employer]. Care instructions adapted under license by [employer] (which disclaims liability or warranty for this information). If you have questions about a medical condition or this instruction, always ask your healthcare professional. [address] any warranty or liability for your use of this information. Hip Arthritis: Exercises Your Care Instructions Here are some examples of exercises for hip arthritis. Start each exercise slowly. Ease off the exercise if you start to have pain. Your doctor or physical therapist will tell you when you can start these exercises and which ones will work best for you. How to do the exercises Straight-leg raises to the outside 6. Lie on your side, with your affected hip on top. 7. Tighten the front thigh muscles of your top leg to keep your knee straight. 8. Keep your hip and your leg straight in line with the rest of your body, and keep your knee pointing forward. Do not drop your hip back. 9. Lift your top leg straight up toward the ceiling, about 12 inches off the floor. Hold for about 6 seconds, then slowly lower your leg. 10. Repeat 8 to 12 times. 11. Switch legs and repeat steps 1 through 5, even if only one hip is sore. Straight-leg raises to the inside 4. Lie on your side with your affected hip on the floor. 5. You can either prop up your other leg on a chair, or you can bend that knee and put that foot in front of your other knee. Do not drop your hip back. 6. Tighten the muscles on the front thigh of your bottom leg to straighten that knee. 7. Keep your kneecap pointing forward and your leg straight, and lift your bottom leg up toward the ceiling about 6 inches. Hold for about 6 seconds, then lower slowly. 8. Repeat 8 to 12 times. 9. Switch legs and repeat steps 1 through 5, even if only one hip is sore. Hip hike 5. Stand sideways on the bottom step of a staircase, and hold on to the banister or wall. 6. Keeping both knees straight, lift your good leg off the step and let it hang down. Then hike your good hip up to the same level as your affected hip or a little higher. 7. Repeat 8 to 12 times. 8. Switch legs and repeat steps 1 through 3, even if only one hip is sore. Bridging 5. Lie on your back with both knees bent. Your knees should be bent about 90 degrees. 6. Then push your feet into the floor, squeeze your buttocks, and lift your hips off the floor until your shoulders, hips, and knees are all in a straight line. 7. Hold for about 6 seconds as you continue to breathe normally, and then slowly lower your hips back down to the floor and rest for up to 10 seconds. 8. Repeat 8 to 12 times. Hamstring stretch (lying down) 7. Lie flat on your back with your legs straight. If you feel discomfort in your back, place a small towel roll under your lower back. 8. Holding the back of your affected leg, lift your leg straight up and toward your body until you feel a stretch at the back of your thigh. 9. Hold the stretch for at least 30 seconds. 10. Repeat 2 to 4 times. 11. Switch legs and repeat steps 1 through 4, even if only one hip is sore. Standing quadriceps stretch 4. If you are not steady on your feet, hold on to a chair, counter, or wall. You can also lie on your stomach or your side to do this exercise. 5. Bend the knee of the leg you want to stretch, and reach behind you to grab the front of your foot or ankle with the hand on the same side. For example, if you are stretching your right leg, use your right hand. 6. Keeping your knees next to each other, pull your foot toward your buttock until you feel a gentle stretch across the front of your hip and down the front of your thigh. Your knee should be pointed directly to the ground, and not out to the side. 7. Hold the stretch for at least 15 to 30 seconds. 8. Repeat 2 to 4 times. 9. Switch legs and repeat steps 1 through 5, even if only one hip is sore. Hip rotator stretch 1. Lie on your back with both knees bent and your feet flat on the floor. 2. Put the ankle of your affected leg on your opposite thigh near your knee. 3. Use your hand to gently push your knee away from your body until you feel a gentle stretch around your hip. 4. Hold the stretch for 15 to 30 seconds. 5. Repeat 2 to 4 times. 6. Repeat steps 1 through 5, but this time use your hand to gently pull your knee toward your opposite shoulder. 7. Switch legs and repeat steps 1 through 6, even if only one hip is sore. Knee-to-chest 
 
1. Lie on your back with your knees bent and your feet flat on the floor. 2. Bring your affected leg to your chest, keeping the other foot flat on the floor (or keeping the other leg straight, whichever feels better on your lower back). 3. Keep your lower back pressed to the floor. Hold for at least 15 to 30 seconds. 4. Relax, and lower the knee to the starting position. 5. Repeat 2 to 4 times. 6. Switch legs and repeat steps 1 through 5, even if only one hip is sore. 7. To get more stretch, put your other leg flat on the floor while pulling your knee to your chest. 
Clamshell 1. Lie on your side, with your affected hip on top. Keep your feet and knees together and your knees bent. 2. Raise your top knee, but keep your feet together. Do not let your hips roll back. Your legs should open up like a clamshell. 3. Hold for 6 seconds. 4. Slowly lower your knee back down. Rest for 10 seconds. 5. Repeat 8 to 12 times. 6. Switch legs and repeat steps 1 through 5, even if only one hip is sore. Follow-up care is a key part of your treatment and safety. Be sure to make and go to all appointments, and call your doctor if you are having problems. It's also a good idea to know your test results and keep a list of the medicines you take. Where can you learn more? Go to http://ludwin-leonard.info/. Enter M524 in the search box to learn more about \"Hip Arthritis: Exercises. \" Current as of: March 21, 2017 Content Version: 11.3 © 3288-2171 [email or web address]. Care instructions adapted under license by [employer] (which disclaims liability or warranty for this information).  If you have questions about a medical condition or this instruction, always ask your healthcare professional. [address] any warranty or liability for your use of this information. Introducing [facility]! Dear Cece Obrien: Thank you for requesting a [employer] account. Our records indicate that you already have an active SensibleSelf account. You can access your account anytime at [email or web address]. [email or web address] Did you know that you can access your hospital and ER discharge instructions at any time in SensibleSelf? You can also review all of your test results from your hospital stay or ER visit. Additional Information If you have questions, please visit the Frequently Asked Questions section of the SensibleSelf website at [email or web address] Remember, SensibleSelf is NOT to be used for urgent needs. For medical emergencies, dial 911. Now available from your iPhone and Android! Please provide this summary of care documentation to your next provider. Your primary care clinician is listed as Colin [phone] If you have any questions after today's visit, please call [phone].

## 2017-10-05 NOTE — PROGRESS NOTES
Ruba Gerard is a 74 y.o. female and presents for Annual Medicare Wellness Visit. Assessment of cognitive impairment: Alert and oriented x 3. Abuse Screen:    Abuse Screening Questionnaire 10/5/2017   Do you ever feel afraid of your partner? N   Are you in a relationship with someone who physically or mentally threatens you? N   Is it safe for you to go home? Y       Depression Screen:   PHQ over the last two weeks 10/5/2017   Little interest or pleasure in doing things Not at all   Feeling down, depressed or hopeless Not at all   Total Score PHQ 2 0       Fall Risk Assessment:    Fall Risk Assessment, last 12 mths 10/5/2017   Able to walk? Yes   Fall in past 12 months? No       Activities of Daily Living:    ADL Assessment 10/5/2017   Feeding yourself No Help Needed   Getting from bed to chair No Help Needed   Getting dressed No Help Needed   Bathing or showering No Help Needed   Walk across the room (includes cane/walker) No Help Needed   Using the telphone No Help Needed   Taking your medications No Help Needed   Preparing meals No Help Needed   Managing money (expenses/bills) No Help Needed   Moderately strenuous housework (laundry) No Help Needed   Shopping for personal items (toiletries/medicines) No Help Needed   Shopping for groceries No Help Needed   Driving No Help Needed   Climbing a flight of stairs No Help Needed   Getting to places beyond walking distances No Help Needed       Health Maintenance:  Daily Low Dose Aspirin: no  Bone Density: 3/18/13  Glaucoma Screening: yes, records requested from Dr. Magy Christensen  Immunizations:    Tetanus: up to date 7/18/12. Influenza: will obtain from pharmacy. Shingles:  up to date 5/22/07. Pneumovax:  up to date 6/03/09. Prevnar: up to date 8/6/15. Cancer screening:    Cervical: NA.  Breast: scheduled for November 2017. Colon: Done last week with Dr. Sammy Hayes, patient reports negative for polyps.   Prostate:  NA    Advance Care Planning:   End of Life Planning: ACP is on file. Document reviewed with patient, she states it is up to date. Provided pt with \"Respecting Choices packet of Information\" no  Offered facilitator session with NN no     Medications/Allergies: Reviewed with patient  Prior to Admission medications    Medication Sig Start Date End Date Taking? Authorizing Provider   TURMERIC, BULK, by Does Not Apply route. Yes Historical Provider   valACYclovir (VALTREX) 1 gram tablet Take 1 Tab by mouth daily. TAKE 1 TABLET DAILY AS DIRECTED  Indications: GENITAL HERPES SIMPLEX  Patient taking differently: Take 500 mg by mouth daily. TAKE 1 TABLET DAILY AS DIRECTED  Indications: genital herpes simplex 2/14/17  Yes Vernon Gilford III, MD   Cholecalciferol, Vitamin D3, (VITAMIN D3) 1,000 unit cap Take  by mouth. Yes Historical Provider   fexofenadine (ALLEGRA) 180 mg tablet Take 1 Tab by mouth daily. 7/13/11  Yes Vernon Gilford III, MD   metronidazole (METROCREAM) 0.75 % topical cream Apply  to affected area two (2) times a day. Use a thin layer to affected areas after washing    Yes Historical Provider   ibuprofen (ADVIL) 200 mg tablet Take 2 Tabs by mouth every eight (8) hours as needed for Pain. 1/5/17   Vernon Gilford III, MD   DOCOSAHEXANOIC ACID (DHA ALGAL-900 PO) Take  by mouth.     Historical Provider     No Known Allergies    PSH: Reviewed with patient  Past Surgical History:   Procedure Laterality Date    HX HYSTERECTOMY      HX ORTHOPAEDIC      FROZEN SHOULDER    HX ORTHOPAEDIC  10/2012    Foot surgery    SD BARON RAMIREZ JROBIN W ANESTHESIA      XR DEXA BONE DENSITY STUDY AXIAL  04.2009        SH: Reviewed with patient  Social History   Substance Use Topics    Smoking status: Former Smoker     Packs/day: 0.25     Years: 11.00     Quit date: 2/26/1973    Smokeless tobacco: Never Used    Alcohol use No       FH: Reviewed with patient  Family History   Problem Relation Age of Onset    Heart Disease Mother     Cancer Father      Prostate and skin    Heart Disease Father      CAD    Stroke Father     Cancer Brother      Melanoma    Cancer Daughter      Melanoma    Cancer Brother      Skin         Objective:  Visit Vitals    /78 (BP 1 Location: Left arm, BP Patient Position: Sitting)    Pulse 73    Temp 98 °F (36.7 °C) (Oral)    Resp 18    Ht 5' 4.5\" (1.638 m)    Wt 128 lb 6.4 oz (58.2 kg)    SpO2 99%    BMI 21.7 kg/m2    Body mass index is 21.7 kg/(m^2). Alcohol Risk Screen:   On any occasion during past 3 months, have you had more than 3 drinks (female) or 4 drinks (male) containing alcohol? No  Do you average more than 7 drinks (female) or 14 drinks (male) per week? No  Type and Amount: N/A    Tobacco Abuse:  No    Nutrition Screen:  eats a balanced diet     Hearing Loss:  denies any hearing loss    Vision Loss:   Wears glasses, contact lenses, or have any other visual impairment  Wears glasses    Activities of Daily Living:  Self-care. Requires assistance with: no ADLs  Patient handle his/her own medications  yes Use of pill box  no    Current medical providers:    Patient Care Team:  Raleigh Andrade MD as PCP - Vicki Lopez MD (Ophthalmology)  Sukhi Rodriguez MD (Obstetrics & Gynecology)      Plan:      Orders Placed This Encounter   Mary Mckoy,       Health Maintenance   Topic Date Due    COLONOSCOPY  01/01/2016    INFLUENZA AGE 9 TO ADULT  08/01/2017    GLAUCOMA SCREENING Q2Y  05/31/2018    MEDICARE YEARLY EXAM  10/06/2018    DTaP/Tdap/Td series (2 - Td) 07/18/2022    OSTEOPOROSIS SCREENING (DEXA)  Completed    ZOSTER VACCINE AGE 60>  Completed    Pneumococcal 65+ High/Highest Risk  Completed       *Patient verbalized understanding and agreement with the plan. A copy of the After Visit Summary with personalized health plan was given to the patient today. Physical Exam will be performed by PCP and documented under a separate Progress Note.

## 2017-10-27 LAB
25(OH)D3+25(OH)D2 SERPL-MCNC: 26.5 NG/ML (ref 30–100)
ALBUMIN SERPL-MCNC: 4.1 G/DL (ref 3.5–4.8)
ALBUMIN/GLOB SERPL: 1.5 {RATIO} (ref 1.2–2.2)
ALP SERPL-CCNC: 90 IU/L (ref 39–117)
ALT SERPL-CCNC: 18 IU/L (ref 0–32)
APPEARANCE UR: CLEAR
AST SERPL-CCNC: 25 IU/L (ref 0–40)
BACTERIA #/AREA URNS HPF: NORMAL /[HPF]
BACTERIA UR CULT: NORMAL
BASOPHILS # BLD AUTO: 0 X10E3/UL (ref 0–0.2)
BASOPHILS NFR BLD AUTO: 1 %
BILIRUB SERPL-MCNC: 0.3 MG/DL (ref 0–1.2)
BILIRUB UR QL STRIP: NEGATIVE
BUN SERPL-MCNC: 14 MG/DL (ref 8–27)
BUN/CREAT SERPL: 19 (ref 12–28)
CALCIUM SERPL-MCNC: 9.7 MG/DL (ref 8.7–10.3)
CASTS URNS QL MICRO: NORMAL /LPF
CHLORIDE SERPL-SCNC: 101 MMOL/L (ref 96–106)
CHOLEST SERPL-MCNC: 224 MG/DL (ref 100–199)
CO2 SERPL-SCNC: 28 MMOL/L (ref 18–29)
COLOR UR: YELLOW
CREAT SERPL-MCNC: 0.75 MG/DL (ref 0.57–1)
EOSINOPHIL # BLD AUTO: 0.1 X10E3/UL (ref 0–0.4)
EOSINOPHIL NFR BLD AUTO: 2 %
EPI CELLS #/AREA URNS HPF: NORMAL /HPF
ERYTHROCYTE [DISTWIDTH] IN BLOOD BY AUTOMATED COUNT: 13.4 % (ref 12.3–15.4)
GFR SERPLBLD CREATININE-BSD FMLA CKD-EPI: 79 ML/MIN/1.73
GFR SERPLBLD CREATININE-BSD FMLA CKD-EPI: 91 ML/MIN/1.73
GLOBULIN SER CALC-MCNC: 2.7 G/DL (ref 1.5–4.5)
GLUCOSE SERPL-MCNC: 78 MG/DL (ref 65–99)
GLUCOSE UR QL: NEGATIVE
HCT VFR BLD AUTO: 40.2 % (ref 34–46.6)
HDLC SERPL-MCNC: 70 MG/DL
HGB BLD-MCNC: 13.6 G/DL (ref 11.1–15.9)
HGB UR QL STRIP: NEGATIVE
IMM GRANULOCYTES # BLD: 0 X10E3/UL (ref 0–0.1)
IMM GRANULOCYTES NFR BLD: 0 %
KETONES UR QL STRIP: NEGATIVE
LDLC SERPL CALC-MCNC: 133 MG/DL (ref 0–99)
LEUKOCYTE ESTERASE UR QL STRIP: ABNORMAL
LYMPHOCYTES # BLD AUTO: 2.9 X10E3/UL (ref 0.7–3.1)
LYMPHOCYTES NFR BLD AUTO: 51 %
MCH RBC QN AUTO: 31.4 PG (ref 26.6–33)
MCHC RBC AUTO-ENTMCNC: 33.8 G/DL (ref 31.5–35.7)
MCV RBC AUTO: 93 FL (ref 79–97)
MICRO URNS: ABNORMAL
MONOCYTES # BLD AUTO: 0.3 X10E3/UL (ref 0.1–0.9)
MONOCYTES NFR BLD AUTO: 6 %
MUCOUS THREADS URNS QL MICRO: PRESENT
NEUTROPHILS # BLD AUTO: 2.3 X10E3/UL (ref 1.4–7)
NEUTROPHILS NFR BLD AUTO: 40 %
NITRITE UR QL STRIP: NEGATIVE
PH UR STRIP: 6 [PH] (ref 5–7.5)
PLATELET # BLD AUTO: 224 X10E3/UL (ref 150–379)
POTASSIUM SERPL-SCNC: 5.2 MMOL/L (ref 3.5–5.2)
PROT SERPL-MCNC: 6.8 G/DL (ref 6–8.5)
PROT UR QL STRIP: NEGATIVE
RBC # BLD AUTO: 4.33 X10E6/UL (ref 3.77–5.28)
RBC #/AREA URNS HPF: NORMAL /HPF
SODIUM SERPL-SCNC: 143 MMOL/L (ref 134–144)
SP GR UR: 1.02 (ref 1–1.03)
TRIGL SERPL-MCNC: 103 MG/DL (ref 0–149)
TSH SERPL DL<=0.005 MIU/L-ACNC: 1.48 UIU/ML (ref 0.45–4.5)
URINALYSIS REFLEX, 377202: ABNORMAL
UROBILINOGEN UR STRIP-MCNC: 0.2 MG/DL (ref 0.2–1)
VLDLC SERPL CALC-MCNC: 21 MG/DL (ref 5–40)
WBC # BLD AUTO: 5.7 X10E3/UL (ref 3.4–10.8)
WBC #/AREA URNS HPF: NORMAL /HPF

## 2018-07-23 RX ORDER — VALACYCLOVIR HYDROCHLORIDE 1 G/1
TABLET, FILM COATED ORAL
Qty: 90 TAB | Refills: 0 | Status: SHIPPED | OUTPATIENT
Start: 2018-07-23 | End: 2019-02-22 | Stop reason: SDUPTHER

## 2018-10-08 ENCOUNTER — OFFICE VISIT (OUTPATIENT)
Dept: INTERNAL MEDICINE CLINIC | Age: 73
End: 2018-10-08

## 2018-10-08 VITALS
RESPIRATION RATE: 14 BRPM | HEART RATE: 67 BPM | HEIGHT: 65 IN | TEMPERATURE: 98.1 F | BODY MASS INDEX: 21.63 KG/M2 | DIASTOLIC BLOOD PRESSURE: 74 MMHG | OXYGEN SATURATION: 98 % | WEIGHT: 129.8 LBS | SYSTOLIC BLOOD PRESSURE: 134 MMHG

## 2018-10-08 DIAGNOSIS — E55.9 VITAMIN D DEFICIENCY: ICD-10-CM

## 2018-10-08 DIAGNOSIS — Z00.00 MEDICARE ANNUAL WELLNESS VISIT, SUBSEQUENT: Primary | ICD-10-CM

## 2018-10-08 DIAGNOSIS — C80.1 CANCER (HCC): ICD-10-CM

## 2018-10-08 DIAGNOSIS — E78.2 MIXED HYPERLIPIDEMIA: ICD-10-CM

## 2018-10-08 DIAGNOSIS — C43.61 MALIGNANT MELANOMA OF RIGHT UPPER EXTREMITY INCLUDING SHOULDER (HCC): ICD-10-CM

## 2018-10-08 NOTE — MR AVS SNAPSHOT
[address] [place] [address] 
[phone] Patient: Francisco Tong MRN: [id number] LQI:[id number] Visit Information Date & Time Provider Department Dept. Phone Encounter #  
 10/8/2018  1:30 PM Silvia Ag MD [facility] Internal Medicine [phone] [id number] Upcoming Health Maintenance Date Due Shingrix Vaccine Age 50> (1 of 2) 2/26/1993 GLAUCOMA SCREENING Q2Y 5/31/2018 Influenza Age 5 to Adult 8/1/2018 DTaP/Tdap/Td series (2 - Td) 7/18/2022 COLONOSCOPY 9/26/2027 Allergies as of 10/8/2018  Review Complete On: 10/8/2018 By: Silvia Ag MD  
 No Known Allergies Current Immunizations  Reviewed on 10/8/2018 Name Date Influenza High Dose Vaccine PF 10/6/2017 12:00 AM, 10/17/2016, 9/28/2015, 9/30/2014 Influenza Vaccine Whole 11/1/2009 Pneumococcal Conjugate (PCV-13) 8/6/2015 TDAP Vaccine 7/18/2012 ZZZ-RETIRED (DO NOT USE) Pneumococcal Vaccine (Unspecified Type) 6/3/2009 Zoster 5/22/2007 Reviewed by Silvia Ag MD on 10/8/2018 at  2:12 PM  
 Reviewed by Silvia Ag MD on 10/8/2018 at  2:12 PM  
You Were Diagnosed With   
  
 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  
 21.6 kg/m 2 1.64 m 2 Preferred Pharmacy Pharmacy Name Phone [id number] - [place], [address] [phone] Your Updated Medication List  
  
   
This list is accurate as of 10/8/18  2:29 PM.  Always use your most recent med list.  
  
  
  
  
 DHA ALGAL-900 PO Take  by mouth. fexofenadine 180 mg tablet Commonly known as:  Ulyess  Take 1 Tab by mouth daily. MAGNESIUM CITRATE PO Take 250 mg by mouth.  
  
 metroNIDAZOLE 0.75 % topical cream  
Commonly known as:  METROCREAM  
Apply  to affected area two (2) times a day. Use a thin layer to affected areas after washing TURMERIC (BULK)  
by Does Not Apply route. valACYclovir 1 gram tablet Commonly known as:  VALTREX  
TAKE 1 TABLET BY MOUTH EVERY DAY AS DIRECTED  
  
 varicella-zoster recombinant (PF) 50 mcg/0.5 mL Susr injection Commonly known as:  SHINGRIX (PF)  
0.5 mL by IntraMUSCular route once for 1 dose. VITAMIN D3 1,000 unit Cap Generic drug:  cholecalciferol Take  by mouth. Prescriptions Printed Refills  
 varicella-zoster recombinant, PF, (SHINGRIX, PF,) 50 mcg/0.5 mL susr injection 1 Si.5 mL by IntraMUSCular route once for 1 dose. Class: Print Route: IntraMUSCular We Performed the Following CBC WITH AUTOMATED DIFF [45263 CPT(R)] LIPID PANEL [18106 CPT(R)] METABOLIC PANEL, COMPREHENSIVE [22480 CPT(R)] TSH RFX ON ABNORMAL TO FREE T4 [[id number] Custom] VITAMIN D, 25 HYDROXY [id number] CPT(R)] Patient Instructions Medicare Wellness Visit, Female The best way to live healthy is to have a lifestyle where you eat a well-balanced diet, exercise regularly, limit alcohol use, and quit all forms of tobacco/nicotine, if applicable. Regular preventive services are another way to keep healthy.  Preventive services (vaccines, screening tests, monitoring & exams) can help personalize your care plan, which helps you manage your own care. Screening tests can find health problems at the earliest stages, when they are easiest to treat. [facility] follows the current, evidence-based guidelines published by the [facility] Caleb Gaona ([facility]) when recommending preventive services for our patients. Because we follow these guidelines, sometimes recommendations change over time as research supports it. (For example, mammograms used to be recommended annually. Even though Medicare will still pay for an annual mammogram, the newer guidelines recommend a mammogram every two years for women of average risk.) Of course, you and your doctor may decide to screen more often for some diseases, based on your risk and your health status. Preventive services for you include: - Medicare offers their members a free annual wellness visit, which is time for you and your primary care provider to discuss and plan for your preventive service needs. Take advantage of this benefit every year! 
-All adults over the age of 72 should receive the recommended pneumonia vaccines. Current USPSTF guidelines recommend a series of two vaccines for the best pneumonia protection.  
-All adults should have a flu vaccine yearly and a tetanus vaccine every 10 years. All adults age 61 and older should receive a shingles vaccine once in their lifetime.   
-A bone mass density test is recommended when a woman turns 65 to screen for osteoporosis. This test is only recommended one time, as a screening. Some providers will use this same test as a disease monitoring tool if you already have osteoporosis.  
-All adults age 38-68 who are overweight should have a diabetes screening test once every three years.  
-Other screening tests and preventive services for persons with diabetes include: an eye exam to screen for diabetic retinopathy, a kidney function test, a foot exam, and stricter control over your cholesterol.  
-Cardiovascular screening for adults with routine risk involves an electrocardiogram (ECG) at intervals determined by your doctor.  
-Colorectal cancer screenings should be done for adults age 54-65 with no increased risk factors for colorectal cancer. There are a number of acceptable methods of screening for this type of cancer. Each test has its own benefits and drawbacks. Discuss with your doctor what is most appropriate for you during your annual wellness visit. The different tests include: colonoscopy (considered the best screening method), a fecal occult blood test, a fecal DNA test, and sigmoidoscopy. -Breast cancer screenings are recommended every other year for women of normal risk, age 54-69. 
-Cervical cancer screenings for women over age 72 are only recommended with certain risk factors.  
-All adults born between [facility] should be screened once for Hepatitis C. Here is a list of your current Health Maintenance items (your personalized list of preventive services) with a due date: 
Health Maintenance Due Topic Date Due  Shingles Vaccine (1 of 2) 02/26/1993  Glaucoma Screening   05/31/2018  Flu Vaccine  08/01/2018 Learning About Arthritis at the [facility] of the Thumb What is it? Arthritis at the base of the thumb joint is wear and tear on the cartilage. Cartilage is a firm, thick, slippery tissue. It covers and protects the ends of bones where they meet to form a joint. When you have arthritis, there are changes in the cartilage that cause it to break down. The bones rub together and cause joint damage and pain. What causes it? Experts don't know what causes arthritis at the base of the thumb. But aging, a lot of use, an injury, or family history may play a part. What are the symptoms? Symptoms of arthritis at the base of the thumb include aching in your joint. Or the pain may feel burning or sharp. You may feel clicking, creaking, or catching in the joint. It may get stiff. You may have more pain and less strength when you pinch or  things. Symptoms may come and go, stay the same, or get worse over time. How is it diagnosed? Your doctor can often diagnose arthritis by asking you questions about your joint pain and other symptoms and examining you. You may also have X-rays and blood tests. Blood tests can help make sure another disease isn't causing your symptoms. How is it treated? Arthritis at the base of your thumb may be treated with rest, pain relievers, steroid medicines, using a brace or splint, andin some casessurgery. To help relieve pain in the joint, rest your sore hand. Switch hands for some activities. You can try heat and cold therapy, such as hot compresses, paraffin wax, cold packs, or ice massage. Your doctor may give you a splint to wear during some activities or when pain flares up. You can often manage mild or moderate arthritis pain with over-the-counter pain relievers. These include medicines that reduce swelling, such as ibuprofen or naproxen. You can also use acetaminophen. Sometimes these medicines are in creams that you can rub on your thumb and hand. Your doctor may also prescribe other medicine for your pain. For some people, steroid shots may be an option. If none of the treatments work, your doctor may discuss surgery with you. Follow-up care is a key part of your treatment and safety. Be sure to make and go to all appointments, and call your doctor if you are having problems. It's also a good idea to know your test results and keep a list of the medicines you take. Where can you learn more? Go to http://ludwin-leonard.info/. Enter T110 in the search box to learn more about \"Learning About Arthritis at the [facility] of the Thumb. \" 
 Current as of: June 11, 2018 Content Version: 11.8 © 4489-0477 K[employer]. Care instructions adapted under license by [employer] (which disclaims liability or warranty for this information). If you have questions about a medical condition or this instruction, always ask your healthcare professional. [address] any warranty or liability for your use of this information. Thumb Arthritis: Exercises Your Care Instructions Here are some examples of exercises for thumb arthritis. Start each exercise slowly. Ease off the exercise if you start to have pain. Your doctor or your physical or occupational therapist will tell you when you can start these exercises and which ones will work best for you. How to do the exercises Thumb IP flexion 1. Place your forearm and hand on a table with your affected thumb pointing up. 2. With your other hand, hold your thumb steady just below the joint nearest your thumbnail. 3. Bend the tip of your thumb downward, then straighten it. 4. Repeat 8 to 12 times. 5. Switch hands and repeat steps 1 through 4, even if only one thumb is sore. Thumb MP flexion 1. Place your forearm and hand on a table with your affected thumb pointing up. 2. With your other hand, hold the base of your thumb and palm steady. 3. Bend your thumb downward where it meets your palm, then straighten it. 4. Repeat 8 to 12 times. 5. Switch hands and repeat steps 1 through 4, even if only one thumb is sore. Thumb opposition 1. With your affected hand, point your fingers and thumb straight up. Your wrist should be relaxed, following the line of your fingers and thumb. 2. Touch your affected thumb to each finger, one finger at a time. This will look like an \"okay\" sign, but try to keep your other fingers straight and pointing upward as much as you can. 3. Repeat 8 to 12 times. 4. Switch hands and repeat steps 1 through 3, even if only one thumb is sore. Follow-up care is a key part of your treatment and safety. Be sure to make and go to all appointments, and call your doctor if you are having problems. It's also a good idea to know your test results and keep a list of the medicines you take. Where can you learn more? Go to http://ludwin-leonard.info/. Enter A363 in the search box to learn more about \"Thumb Arthritis: Exercises. \" Current as of: November 29, 2017 Content Version: 11.8 © 3797-4628 Standard Treasury. Care instructions adapted under license by [employer] (which disclaims liability or warranty for this information). If you have questions about a medical condition or this instruction, always ask your healthcare professional. [address] any warranty or liability for your use of this information. Introducing [facility] & [facility]! Dear Brian Grossman: Thank you for requesting a [employer] account. Our records indicate that you already have an active [employer] account. You can access your account anytime at [email or web address] Did you know that you can access your hospital and ER discharge instructions at any time in [employer]? You can also review all of your test results from your hospital stay or ER visit. Additional Information If you have questions, please visit the Frequently Asked Questions section of the [employer] website at [email or web address] Remember, [employer] is NOT to be used for urgent needs. For medical emergencies, dial 911. Now available from your iPhone and Android! Please provide this summary of care documentation to your next provider. Your primary care clinician is listed as Colin [phone] If you have any questions after today's visit, please call [phone].

## 2018-10-08 NOTE — PATIENT INSTRUCTIONS
Medicare Wellness Visit, Female The best way to live healthy is to have a lifestyle where you eat a well-balanced diet, exercise regularly, limit alcohol use, and quit all forms of tobacco/nicotine, if applicable. Regular preventive services are another way to keep healthy. Preventive services (vaccines, screening tests, monitoring & exams) can help personalize your care plan, which helps you manage your own care. Screening tests can find health problems at the earliest stages, when they are easiest to treat. [facility] follows the current, evidence-based guidelines published by the [facility] Caleb Jimenez ([facility]) when recommending preventive services for our patients. Because we follow these guidelines, sometimes recommendations change over time as research supports it. (For example, mammograms used to be recommended annually. Even though Medicare will still pay for an annual mammogram, the newer guidelines recommend a mammogram every two years for women of average risk.) Of course, you and your doctor may decide to screen more often for some diseases, based on your risk and your health status. Preventive services for you include: - Medicare offers their members a free annual wellness visit, which is time for you and your primary care provider to discuss and plan for your preventive service needs. Take advantage of this benefit every year! 
-All adults over the age of 72 should receive the recommended pneumonia vaccines. Current USPSTF guidelines recommend a series of two vaccines for the best pneumonia protection.  
-All adults should have a flu vaccine yearly and a tetanus vaccine every 10 years. All adults age 61 and older should receive a shingles vaccine once in their lifetime.   
-A bone mass density test is recommended when a woman turns 65 to screen for osteoporosis. This test is only recommended one time, as a screening. Some providers will use this same test as a disease monitoring tool if you already have osteoporosis. -All adults age 38-68 who are overweight should have a diabetes screening test once every three years.  
-Other screening tests and preventive services for persons with diabetes include: an eye exam to screen for diabetic retinopathy, a kidney function test, a foot exam, and stricter control over your cholesterol.  
-Cardiovascular screening for adults with routine risk involves an electrocardiogram (ECG) at intervals determined by your doctor.  
-Colorectal cancer screenings should be done for adults age 54-65 with no increased risk factors for colorectal cancer. There are a number of acceptable methods of screening for this type of cancer. Each test has its own benefits and drawbacks. Discuss with your doctor what is most appropriate for you during your annual wellness visit. The different tests include: colonoscopy (considered the best screening method), a fecal occult blood test, a fecal DNA test, and sigmoidoscopy. -Breast cancer screenings are recommended every other year for women of normal risk, age 54-69. 
-Cervical cancer screenings for women over age 72 are only recommended with certain risk factors.  
-All adults born between [facility] should be screened once for Hepatitis C. Here is a list of your current Health Maintenance items (your personalized list of preventive services) with a due date: 
Health Maintenance Due Topic Date Due  Shingles Vaccine (1 of 2) 02/26/1993  Glaucoma Screening   05/31/2018  Flu Vaccine  08/01/2018 Learning About Arthritis at the [facility] of the Thumb What is it? Arthritis at the base of the thumb joint is wear and tear on the cartilage. Cartilage is a firm, thick, slippery tissue. It covers and protects the ends of bones where they meet to form a joint. When you have arthritis, there are changes in the cartilage that cause it to break down. The bones rub together and cause joint damage and pain. What causes it? Experts don't know what causes arthritis at the base of the thumb. But aging, a lot of use, an injury, or family history may play a part. What are the symptoms? Symptoms of arthritis at the base of the thumb include aching in your joint. Or the pain may feel burning or sharp. You may feel clicking, creaking, or catching in the joint. It may get stiff. You may have more pain and less strength when you pinch or  things. Symptoms may come and go, stay the same, or get worse over time. How is it diagnosed? Your doctor can often diagnose arthritis by asking you questions about your joint pain and other symptoms and examining you. You may also have X-rays and blood tests. Blood tests can help make sure another disease isn't causing your symptoms. How is it treated? Arthritis at the base of your thumb may be treated with rest, pain relievers, steroid medicines, using a brace or splint, andin some casessurgery. To help relieve pain in the joint, rest your sore hand. Switch hands for some activities. You can try heat and cold therapy, such as hot compresses, paraffin wax, cold packs, or ice massage. Your doctor may give you a splint to wear during some activities or when pain flares up. You can often manage mild or moderate arthritis pain with over-the-counter pain relievers. These include medicines that reduce swelling, such as ibuprofen or naproxen. You can also use acetaminophen. Sometimes these medicines are in creams that you can rub on your thumb and hand. Your doctor may also prescribe other medicine for your pain. For some people, steroid shots may be an option. If none of the treatments work, your doctor may discuss surgery with you. Follow-up care is a key part of your treatment and safety.  Be sure to make and go to all appointments, and call your doctor if you are having problems. It's also a good idea to know your test results and keep a list of the medicines you take. Where can you learn more? Go to http://ludwin-leonard.info/. Enter T110 in the search box to learn more about \"Learning About Arthritis at the [facility] of the Thumb. \" Current as of: June 11, 2018 Content Version: 11.8 © 0680-2082 Medigus. Care instructions adapted under license by [employer] (which disclaims liability or warranty for this information). If you have questions about a medical condition or this instruction, always ask your healthcare professional. [place] [address] any warranty or liability for your use of this information. Thumb Arthritis: Exercises Your Care Instructions Here are some examples of exercises for thumb arthritis. Start each exercise slowly. Ease off the exercise if you start to have pain. Your doctor or your physical or occupational therapist will tell you when you can start these exercises and which ones will work best for you. How to do the exercises Thumb IP flexion 1. Place your forearm and hand on a table with your affected thumb pointing up. 2. With your other hand, hold your thumb steady just below the joint nearest your thumbnail. 3. Bend the tip of your thumb downward, then straighten it. 4. Repeat 8 to 12 times. 5. Switch hands and repeat steps 1 through 4, even if only one thumb is sore. Thumb MP flexion 1. Place your forearm and hand on a table with your affected thumb pointing up. 2. With your other hand, hold the base of your thumb and palm steady. 3. Bend your thumb downward where it meets your palm, then straighten it. 4. Repeat 8 to 12 times. 5. Switch hands and repeat steps 1 through 4, even if only one thumb is sore. Thumb opposition 1. With your affected hand, point your fingers and thumb straight up.  Your wrist should be relaxed, following the line of your fingers and thumb. 2. Touch your affected thumb to each finger, one finger at a time. This will look like an \"okay\" sign, but try to keep your other fingers straight and pointing upward as much as you can. 3. Repeat 8 to 12 times. 4. Switch hands and repeat steps 1 through 3, even if only one thumb is sore. Follow-up care is a key part of your treatment and safety. Be sure to make and go to all appointments, and call your doctor if you are having problems. It's also a good idea to know your test results and keep a list of the medicines you take. Where can you learn more? Go to http://ludwin-leonard.info/. Enter T946 in the search box to learn more about \"Thumb Arthritis: Exercises. \" Current as of: November 29, 2017 Content Version: 11.8 © 5704-0767 Healthwise[employer]. Care instructions adapted under license by [employer] (which disclaims liability or warranty for this information). If you have questions about a medical condition or this instruction, always ask your healthcare professional. [address] any warranty or liability for your use of this information.

## 2018-10-08 NOTE — PROGRESS NOTES
This is the Subsequent Medicare Annual Wellness Exam, performed 12 months or more after the Initial AWV or the last Subsequent AWV As well as for follow-up of her health issues. She has been generally feeling well. Denies any headaches or dizziness. No chest pains or shortness of breath. No cough or wheeze. Denies change in bowel or bladder habits. She does have some occasional arthritis pain in her thumb. I have reviewed the patient's medical history in detail and updated the computerized patient record. History Past Medical History:  
Diagnosis Date  Endometriosis  HSV-2 infection  Skin cancer   
 melanoma x 4 Past Surgical History:  
Procedure Laterality Date  HX COLONOSCOPY  09/26/2017  
 10 yr f/u - Dr. Jonh Washington  HX ORTHOPAEDIC FROZEN SHOULDER  
 HX ORTHOPAEDIC  10/2012 Foot surgery [address]  XR DEXA BONE DENSITY STUDY AXIAL  04.2009 Current Outpatient Prescriptions Medication Sig Dispense Refill  MAGNESIUM CITRATE PO Take 250 mg by mouth.  valACYclovir (VALTREX) 1 gram tablet TAKE 1 TABLET BY MOUTH EVERY DAY AS DIRECTED 90 Tab 0  TURMERIC, BULK, by Does Not Apply route.  Cholecalciferol, Vitamin D3, (VITAMIN D3) 1,000 unit cap Take  by mouth.  DOCOSAHEXANOIC ACID (DHA ALGAL-900 PO) Take  by mouth.  fexofenadine (ALLEGRA) 180 mg tablet Take 1 Tab by mouth daily. 90 Tab 3  
 metronidazole (METROCREAM) 0.75 % topical cream Apply  to affected area two (2) times a day. Use a thin layer to affected areas after washing No Known Allergies Family History Problem Relation Age of Onset  Heart Disease Mother  Cancer Father Prostate and skin  Heart Disease Father CAD  Stroke Father  Cancer Brother Melanoma  Cancer Daughter Melanoma  Cancer Brother Skin Social History Substance Use Topics  Smoking status: Former Smoker Packs/day: 0.25 Years: 11.00 Quit date: 2/26/1973  Smokeless tobacco: Never Used  Alcohol use No  
 
Patient Active Problem List  
Diagnosis Code  Cancer ([facility] C80.1  Endometriosis 18  
 HSV-2 infection B00.9  Mixed hyperlipidemia E78.2  Melanoma ([facility] C43.9  Advance directive discussed with patient Z70.80  Vitamin D deficiency E55.9  
ROS - Per HPI Physical Examination: General appearance - alert, well appearing, and in no distress Eyes - pupils equal and reactive, extraocular eye movements intact Ears - bilateral TM's and external ear canals normal 
Nose - normal and patent, no erythema, discharge or polyps Mouth - mucous membranes moist, pharynx normal without lesions Neck - supple, no significant adenopathy Lymphatics - no palpable lymphadenopathy, no hepatosplenomegaly Chest - clear to auscultation, no wheezes, rales or rhonchi, symmetric air entry Heart - normal rate, regular rhythm, normal S1, S2, no murmurs, rubs, clicks or gallops Abdomen - soft, nontender, nondistended, no masses or organomegaly Neurological - alert, oriented, normal speech, no focal findings or movement disorder noted Musculoskeletal - osteoarthritic changes noted in both hands Extremities - peripheral pulses normal, no pedal edema, no clubbing or cyanosis Depression Risk Factor Screening: PHQ over the last two weeks 10/8/2018 Little interest or pleasure in doing things Not at all Feeling down, depressed, irritable, or hopeless Not at all Total Score PHQ 2 0 Alcohol Risk Factor Screening: You do not drink alcohol or very rarely. Functional Ability and Level of Safety:  
Hearing Loss Hearing is good. Activities of Daily Living The home contains: no safety equipment. Patient does total self care Fall Risk Fall Risk Assessment, last 12 mths 10/8/2018 Able to walk? Yes Fall in past 12 months? No  
 
 
Abuse Screen Patient is not abused Cognitive Screening Evaluation of Cognitive Function: 
Has your family/caregiver stated any concerns about your memory: no 
 
 
Patient Care Team  
Patient Care Team: 
Noe Holder MD as PCP - Mitch Jacobson MD (Ophthalmology) Cira Winslow MD (Obstetrics & Gynecology) Assessment/Plan Education and counseling provided: 
Are appropriate based on today's review and evaluation End-of-Life planning (with patient's consent) Influenza Vaccine Screening Mammography Diabetes screening test 
 
Diagnoses and all orders for this visit: 
 
1. Medicare annual wellness visit, subsequent 
-     varicella-zoster recombinant, PF, (SHINGRIX, PF,) 50 mcg/0.5 mL susr injection; 0.5 mL by IntraMUSCular route once for 1 dose. 2. Cancer ([facility] -melanoma. Is seeing dermatology every 3 months. 3. Malignant melanoma of right upper extremity including shoulder ([facility] 4. Mixed hyperlipidemia -controlled. Will check labs to be sure this is adequate. -     CBC WITH AUTOMATED DIFF 
-     METABOLIC PANEL, COMPREHENSIVE 
-     LIPID PANEL 
-     TSH RFX ON ABNORMAL TO FREE T4 
 
5. Vitamin D deficiency -levels to be sure this is stable. 6.  Osteoarthritis of the thumbs will treat with stretching exercises and Tylenol as needed. -     VITAMIN D, 25 HYDROXY Follow-up Disposition: 12 months and as needed. Advised her to call back or return to office if symptoms worsen/change/persist. 
Discussed expected course/resolution/complications of diagnosis in detail with patient. Medication risks/benefits/costs/interactions/alternatives discussed with patient. She was given an after visit summary which includes diagnoses, current medications, & vitals. She expressed understanding with the diagnosis and plan. Health Maintenance Due Topic Date Due  Shingrix Vaccine Age 50> (1 of 2) 02/26/1993  GLAUCOMA SCREENING Q2Y  05/31/2018  Influenza Age 5 to Adult  08/01/2018

## 2018-12-20 LAB
25(OH)D3+25(OH)D2 SERPL-MCNC: 29.7 NG/ML (ref 30–100)
ALBUMIN SERPL-MCNC: 4.3 G/DL (ref 3.5–4.8)
ALBUMIN/GLOB SERPL: 1.5 {RATIO} (ref 1.2–2.2)
ALP SERPL-CCNC: 89 IU/L (ref 39–117)
ALT SERPL-CCNC: 21 IU/L (ref 0–32)
AST SERPL-CCNC: 27 IU/L (ref 0–40)
BASOPHILS # BLD AUTO: 0 X10E3/UL (ref 0–0.2)
BASOPHILS NFR BLD AUTO: 1 %
BILIRUB SERPL-MCNC: 0.4 MG/DL (ref 0–1.2)
BUN SERPL-MCNC: 11 MG/DL (ref 8–27)
BUN/CREAT SERPL: 13 (ref 12–28)
CALCIUM SERPL-MCNC: 9.5 MG/DL (ref 8.7–10.3)
CHLORIDE SERPL-SCNC: 102 MMOL/L (ref 96–106)
CHOLEST SERPL-MCNC: 224 MG/DL (ref 100–199)
CO2 SERPL-SCNC: 25 MMOL/L (ref 20–29)
CREAT SERPL-MCNC: 0.86 MG/DL (ref 0.57–1)
EOSINOPHIL # BLD AUTO: 0.1 X10E3/UL (ref 0–0.4)
EOSINOPHIL NFR BLD AUTO: 2 %
ERYTHROCYTE [DISTWIDTH] IN BLOOD BY AUTOMATED COUNT: 13.1 % (ref 12.3–15.4)
GLOBULIN SER CALC-MCNC: 2.8 G/DL (ref 1.5–4.5)
GLUCOSE SERPL-MCNC: 81 MG/DL (ref 65–99)
HCT VFR BLD AUTO: 40.7 % (ref 34–46.6)
HDLC SERPL-MCNC: 74 MG/DL
HGB BLD-MCNC: 14.1 G/DL (ref 11.1–15.9)
IMM GRANULOCYTES # BLD: 0 X10E3/UL (ref 0–0.1)
IMM GRANULOCYTES NFR BLD: 0 %
LDLC SERPL CALC-MCNC: 134 MG/DL (ref 0–99)
LYMPHOCYTES # BLD AUTO: 3.2 X10E3/UL (ref 0.7–3.1)
LYMPHOCYTES NFR BLD AUTO: 50 %
MCH RBC QN AUTO: 32.7 PG (ref 26.6–33)
MCHC RBC AUTO-ENTMCNC: 34.6 G/DL (ref 31.5–35.7)
MCV RBC AUTO: 94 FL (ref 79–97)
MONOCYTES # BLD AUTO: 0.4 X10E3/UL (ref 0.1–0.9)
MONOCYTES NFR BLD AUTO: 7 %
NEUTROPHILS # BLD AUTO: 2.5 X10E3/UL (ref 1.4–7)
NEUTROPHILS NFR BLD AUTO: 40 %
PLATELET # BLD AUTO: 220 X10E3/UL (ref 150–379)
POTASSIUM SERPL-SCNC: 4.4 MMOL/L (ref 3.5–5.2)
PROT SERPL-MCNC: 7.1 G/DL (ref 6–8.5)
RBC # BLD AUTO: 4.31 X10E6/UL (ref 3.77–5.28)
SODIUM SERPL-SCNC: 140 MMOL/L (ref 134–144)
TRIGL SERPL-MCNC: 81 MG/DL (ref 0–149)
TSH SERPL DL<=0.005 MIU/L-ACNC: 1.49 UIU/ML (ref 0.45–4.5)
VLDLC SERPL CALC-MCNC: 16 MG/DL (ref 5–40)
WBC # BLD AUTO: 6.3 X10E3/UL (ref 3.4–10.8)

## 2019-02-22 RX ORDER — VALACYCLOVIR HYDROCHLORIDE 1 G/1
1000 TABLET, FILM COATED ORAL DAILY
Qty: 90 TAB | Refills: 0 | Status: SHIPPED | OUTPATIENT
Start: 2019-02-22 | End: 2019-10-09 | Stop reason: SDUPTHER

## 2019-02-22 NOTE — TELEPHONE ENCOUNTER
Orders Placed This Encounter    valACYclovir (VALTREX) 1 gram tablet     Sig: Take 1 Tab by mouth daily. Dispense:  90 Tab     Refill:  0     The above orders were approved via [facility] per Dr. Aruna Conn III.

## 2019-07-12 ENCOUNTER — OFFICE VISIT (OUTPATIENT)
Dept: INTERNAL MEDICINE CLINIC | Age: 74
End: 2019-07-12

## 2019-07-12 VITALS
BODY MASS INDEX: 21.83 KG/M2 | DIASTOLIC BLOOD PRESSURE: 79 MMHG | SYSTOLIC BLOOD PRESSURE: 129 MMHG | RESPIRATION RATE: 12 BRPM | HEIGHT: 65 IN | HEART RATE: 71 BPM | OXYGEN SATURATION: 95 % | WEIGHT: 131 LBS | TEMPERATURE: 97.8 F

## 2019-07-12 DIAGNOSIS — M79.2 NEUROPATHIC PAIN: ICD-10-CM

## 2019-07-12 DIAGNOSIS — I89.0 LYMPHEDEMA OF LEFT ARM: Primary | ICD-10-CM

## 2019-07-12 RX ORDER — GABAPENTIN 100 MG/1
100 CAPSULE ORAL
Qty: 60 CAP | Refills: 1 | Status: SHIPPED | OUTPATIENT
Start: 2019-07-12 | End: 2020-10-15 | Stop reason: ALTCHOICE

## 2019-07-13 NOTE — PROGRESS NOTES
HPI:  Franchesca Reddy is a 66y.o. year old female who is here for a discomfort in the left arm that is been present for quite some time. She describes it as tender and painful along the medial left elbow with radiation down the left arm. Over the last few weeks she has had some swelling in the left arm and some \"ridges\" that developed along the forearm. They seem to come and go depending on time of day. She notes that most of the discomfort comes with stimulation of sensation on the arm with cold air or wind. She denies any change in strength. She does have a history of lymph node removal in the left arm due to melanoma. She denies any lower back pain. Denies any neck and shoulder pain. She does note that the discomfort is keeping her from sleeping at night. Past Medical History:   Diagnosis Date    Endometriosis     HSV-2 infection     Skin cancer     melanoma x 4       Past Surgical History:   Procedure Laterality Date    HX COLONOSCOPY  09/26/2017    10 yr f/u - Dr. Ami Irizarry HX ORTHOPAEDIC  10/2012    Foot surgery    [facility] [address] [employer]      XR DEXA BONE DENSITY STUDY AXIAL  04.2009       Prior to Admission medications    Medication Sig Start Date End Date Taking? Authorizing Provider   gabapentin (NEURONTIN) 100 mg capsule Take 1 Cap by mouth three (3) times daily as needed for Pain. Max Daily Amount: 300 mg. 7/12/19  Yes Kirk Razo MD   valACYclovir (VALTREX) 1 gram tablet Take 1 Tab by mouth daily. 2/22/19  Yes Kaya Anand III, MD   MAGNESIUM CITRATE PO Take 250 mg by mouth. Yes Provider, Historical   TURMERIC, BULK, by Does Not Apply route. Yes Provider, Historical   Cholecalciferol, Vitamin D3, (VITAMIN D3) 1,000 unit cap Take  by mouth. Yes Provider, Historical   fexofenadine (ALLEGRA) 180 mg tablet Take 1 Tab by mouth daily.  7/13/11  Yes Kirk Razo MD   metronidazole (METROCREAM) 0.75 % topical cream Apply  to affected area two (2) times a day.  Use a thin layer to affected areas after washing    Yes Provider, Historical       Social History     Socioeconomic History    Marital status: UNKNOWN     Spouse name: Not on file    Number of children: Not on file    Years of education: Not on file    Highest education level: Not on file   Occupational History    Not on file   Social Needs    Financial resource strain: Not on file    Food insecurity:     Worry: Not on file     Inability: Not on file    Transportation needs:     Medical: Not on file     Non-medical: Not on file   Tobacco Use    Smoking status: Former Smoker     Packs/day: 0.25     Years: 11.00     Pack years: 2.75     Last attempt to quit: 1973     Years since quittin.4    Smokeless tobacco: Never Used   Substance and Sexual Activity    Alcohol use: No    Drug use: No    Sexual activity: Not on file   Lifestyle    Physical activity:     Days per week: Not on file     Minutes per session: Not on file    Stress: Not on file   Relationships    Social connections:     Talks on phone: Not on file     Gets together: Not on file     Attends Restoration service: Not on file     Active member of club or organization: Not on file     Attends meetings of clubs or organizations: Not on file     Relationship status: Not on file    Intimate partner violence:     Fear of current or ex partner: Not on file     Emotionally abused: Not on file     Physically abused: Not on file     Forced sexual activity: Not on file   Other Topics Concern    Not on file   Social History Narrative    Not on file          ROS  Per HPI    Visit Vitals  /79   Pulse 71   Temp 97.8 °F (36.6 °C)   Resp 12   Ht 5' 5\" (1.651 m)   Wt 131 lb (59.4 kg)   SpO2 95%   BMI 21.80 kg/m²         Physical Exam   Physical Examination: General appearance - alert, well appearing, and in no distress  Chest - clear to auscultation, no wheezes, rales or rhonchi, symmetric air entry  Heart - normal rate, regular rhythm, normal S1, S2, no murmurs, rubs, clicks or gallops  Neurological - alert, oriented, normal speech, no focal findings or movement disorder noted  Musculoskeletal - abnormal exam of left arm with tenderness across the medial aspect of the elbow along the ulnar groove. No swelling or redness. Normal range of motion. No edema in the hand. Negative Spurling's maneuver. Strength is normal in the upper extremities. Extremities - peripheral pulses normal, no pedal edema, no clubbing or cyanosis      Assessment/Plan:  Diagnoses and all orders for this visit:    1. Lymphedema of left arm -likely related to the previous surgery. At this point will refer for lymphedema therapy.  -     REFERRAL TO LYMPHEDEMA CLINIC  -     gabapentin (NEURONTIN) 100 mg capsule; Take 1 Cap by mouth three (3) times daily as needed for Pain. Max Daily Amount: 300 mg.    2. Neuropathic pain -? Related to her previous surgery and likely ulnar nerve related. Will try low-dose gabapentin at night to see if it is helpful. If not would consider further evaluation. Attempting to avoid having to do nerve conduction velocity studies due to the risk of infection. Follow-up and Dispositions    · Return if symptoms worsen or fail to improve. Advised her to call back or return to office if symptoms worsen/change/persist.  Discussed expected course/resolution/complications of diagnosis in detail with patient. Medication risks/benefits/costs/interactions/alternatives discussed with patient. She was given an after visit summary which includes diagnoses, current medications, & vitals. She expressed understanding with the diagnosis and plan.

## 2019-07-15 ENCOUNTER — TELEPHONE (OUTPATIENT)
Dept: INTERNAL MEDICINE CLINIC | Age: 74
End: 2019-07-15

## 2019-07-15 NOTE — TELEPHONE ENCOUNTER
Kya Ramos (Self) [phone] (H)     Pt is requesting a call back regarding an appt with the [facility] dr Andrew Pavon told to schedule an appt with.

## 2019-08-06 ENCOUNTER — HOSPITAL ENCOUNTER (OUTPATIENT)
Dept: PHYSICAL THERAPY | Age: 74
Discharge: HOME | End: 2019-08-06
Payer: MEDICARE

## 2019-08-06 VITALS — BODY MASS INDEX: 21.66 KG/M2 | WEIGHT: 130 LBS | HEIGHT: 65 IN

## 2019-08-06 PROCEDURE — 97161 PT EVAL LOW COMPLEX 20 MIN: CPT

## 2019-08-06 PROCEDURE — 97116 GAIT TRAINING THERAPY: CPT

## 2019-08-06 PROCEDURE — 97140 MANUAL THERAPY 1/> REGIONS: CPT

## 2019-08-06 PROCEDURE — 97110 THERAPEUTIC EXERCISES: CPT

## 2019-08-06 NOTE — PROGRESS NOTES
[facility]  [address] [facility], [address] [place] [address], [facility].    INITIAL EVALUATION    NAME: Beryle Browns AGE: 66 y.o. GENDER: female  DATE: 8/6/2019  REFERRING PHYSICIAN: Dr. Negro Dennis, III  HISTORY AND BACKGROUND:   Primary Diagnosis:  · L UE lymphedema (I89.0)  Other Treatment Diagnoses:  · Swelling not relieved by elevation (R60.9)  · Lack of coordination (R27.9)  Date of Onset: 7/12/2019  Present Symptoms and Functional Limitations: Patient reports the onset of L UE swelling approximately 5 years ago. The swelling was intermitted but has become more frequent and is exacerbated with yard work, warm weather, and salt intake. Patient had extensive surgery for 3 melanomas located on the left anterior chest, back, and L UE with lymph node dissection over 40 years ago. Patient reports that she had nerve damage to the left arm following surgery and that she has impaired sensation on the left arm medially and has increased sensitivity to cold temperatures. Patient was recently prescribed Gabapentin for L UE pain which affects her sleep, but has not taken the medicine yet. [facility] Lymphedema Assessment Scale: Score of 6 out of 20. Past Medical History:   Past Medical History:   Diagnosis Date    Endometriosis     HSV-2 infection     Skin cancer     melanoma x 4     Past Surgical History:   Procedure Laterality Date    HX COLONOSCOPY  09/26/2017    10 yr f/u - Dr. Ebenezer Gatica HX ORTHOPAEDIC  10/2012    Foot surgery    OH MANIPULATN SHLDR JT W ANESTHESIA      XR DEXA BONE DENSITY STUDY AXIAL  04.2009     Current Medications:    Current Outpatient Medications   Medication Sig    gabapentin (NEURONTIN) 100 mg capsule Take 1 Cap by mouth three (3) times daily as needed for Pain.  Max Daily Amount: 300 mg.    valACYclovir (VALTREX) 1/2 gram tablet Take 1 Tab by mouth daily.  MAGNESIUM CITRATE PO Take 250 mg by mouth.  TURMERIC, BULK, by Does Not Apply route.  Cholecalciferol, Vitamin D3, (VITAMIN D3) 1,000 unit cap Take  by mouth.  fexofenadine (ALLEGRA) 180 mg tablet Take 1 Tab by mouth daily. No current facility-administered medications for this encounter. Allergies: No Known Allergies . Allergies include: cat, dust, ragweed per patient. Prior Level of Function/Social/Work History/Personal factors and/or comorbidities impacting plan of care: Patient is a retired . She lives alone with her family out on the [address] [facility]. She is independent and drives. She does not participate in a regular exercise program but remains active in the yard and occasionally rides a stationary bike. Living Situation: Lives alone in a two story house with the bedroom upstairs. Trainable Caregiver?: None   Self-care/ADLs: Independent     Mobility: Independent ambulation for community distances. Sleeping Arrangement:  In a bed with L UE propped on one pillow. Adaptive Equipment Owned: Cane, crutches   Other: N/A  Previous Therapy:  None  Compression/Lymphedema Equipment:  None    SUBJECTIVE:     Patients goals for therapy: 1. Reduce swelling and nerve damage pain 2. Be able to continue doing yard work and garden activities 3. Sleep better at night     OBJECTIVE DATA SUMMARY:   EXAMINATION/PRESENTATION/DECISION MAKING:   Pain:   Patient complains of intermittent pain near the L wrist and forearm - more prominent with rest and less prominent when active. Self-Care and ADLs:  Grooming: Independent  Bathing: Independent    UB Dressing: Independent  LB Dressing: Independent    Don/Doff Shoes/Socks:  Independent  Toileting: Independent    Other: N/A      Skin and Tissue Assessment:  Dermal Status:  [x]   Intact []  Dry   []  Tenuous [] Flaky   []  Wound/lesion present [x]  Scars - left anterior chest, upper left arm/axillary region, upper left back. []  Dermatitis    Texture/Consistency:  [x]  Boggy - elbow []  Pitting Edema   []  Brawny []  Combination   []  Fibrotic/Woody    Pigmentation/Color Change:  [x]  Normal []  Hemosiderin   []  Red []  Erythematous   []  Hyperpigmented []  Hyperlipodermatosclerosis   Anomalies:  []  Lymphorrhea []  Vesicles   []  Petechiae []  Warty Vercusis   []  Bullae []  Papilloma   Circulatory:  []  MCKENZIE []  Varicosities:   []  Pulses []  Vascular studies ruled out DVT in past   []  DVT History    Nails:  [x]   Normal  []   Fungus  Stemmers Sign: negative   Height:  Height: 5' 5\" (165.1 cm)  Weight:  Weight: 59 kg (130 lb)   BMI:  BMI (calculated): 21.6  (36 or greater: adversely affecting lymphedema)  Wound/Ulcer: N/A      Wound Pain:   N/A  Volumetric Measurements:   Right:  1,988. 07 mL Left:  2,081. 33 mL   % Difference: 4.69% Dominance: Left handed   (See scanned graph)  Range of Motion: within functional limits all extremities  Strength: WFL   Sensation:    [] Intact    [x] Impaired - L UE with sensation proximally < distally. Mobility:    Bed/Chair Mobility:  Independent  Transfers:  Independent    Sitting Balance:  Good Standing Balance: Intact   Gait:  Independent for community distances Wheelchair Mobility:  N/A   Endurance: Tolerance for activity is limited due to scoliosis and pain issues in the groin and back with long distance ambulation.   Stairs:  Modified independent        Safety:  Patient is alert and oriented:  Yes   Safety awareness:  Intact   Fall Risk?:  Low   Patient given written fall prevention handout: Yes   Precautions:  Standard lymphedema precautions to include avoiding blood pressure readings, injections and IVs or other procedures/acts that could lead to broken skin on affected area, and avoiding excessive heat, resistive activity or altitude without compression garment    Based on the above components, the patient evaluation is determined to be of the following complexity level: LOW     Evaluation Time: 10:10 - 11:00 am    TREATMENT PROVIDED:   1. Treatment description:  Therapeutic Exercise and Procedure: Patient instructed in activity restrictions and exercise guidelines. Therapist demonstrated deep abdominal breathing and a remedial lymphedema range of motion exercise program to be done in sitting. Patient was able to complete the routine times 2 reps and deep abdominal breathing with good performance. Patient has been asked to complete breathing exercises and the decongestive exercise routine daily. Instructed patient to walk up to 25 minutes per day within tolerance. Treatment time:  11:00 - 11:15 am  Minutes: 15    2. Treatment description:  Manual Therapy: Patient instructed in skin care principles and anatomy of the lymphatic system. Therapist able to demonstrate manual lymphatic drainage techniques for drainage of the L UE and skin care protocol: Patient was educated in daily skin care with a low Ph lotion using MLD techniques. Educated patient in prevention and treatment of skin injuries and signs and symptoms of cellulitis. Discussed the role and benefit of compression garments daily and/or with high risk activities, such as yard work, airline travel, and exercise. Provided patient with samples of compression sleeves and hand pieces in the clinic today. Patient was able to try on several samples of the garments in the clinic but she was only able to tolerate a compression level of 15 to 20 mmHg due to nerve pain and sensitivity of the L UE. Patient should do well in a garment with lower compression as the UE percentage difference is low and the L UE swelling is intermittent at this time. Patient voiced interest in ordering one Jobst Nancy Lite arm sleeve and gauntlet 15 to 20 mmHg size small. The order will be sent to the vendor, [employer], for processing and the garments will be brought to the clinic for fitting.    Treatment time:  11:15 am - 12:00 am  Minutes: 45    ASSESSMENT:   Kiran Padgett is a 66 y.o. female who presents with L UE secondary lymphedema, stage 1 to 2, following treatment for melanoma over 40 years ago. Patient will benefit from complete decongestive therapy (CDT) including manual lymphatic drainage (MLD) technique, short-stretch textile bandages/compression system to decongest limb, and kinesiotaping as appropriate. Patient will receive instruction in proper skin care to recognize signs/symptoms of and prevent infection, therapeutic exercise, and self-MLD for independent home program and restorative lymphatic performance. This care is medically necessary due to the infection risk with lymphedema and to improve functional activities. CDT is necessary to resolve swelling to allow patient to return to wearing normal clothes/footwear and prevent worsening of symptoms, such as venous stasis ulcerations, infections, or hospitalizations. Patient will be independent with home program strategies to allow improved ADL ability and mobility and to allow patient to return to greatest functional independence. Rehabilitation potential is considered to be Good. Factors which may influence rehabilitation potential include scarring and lack of caregiver assistance. Patient will benefit from 3 to 5 physical therapy visits over 12 weeks to optimize improvement in these areas.     PLAN OF CARE:   Recommendations and Planned Interventions:  Manual lymph drainage/complete decongestive therapy  Multi-layer compression bandaging (short-stretch)  Compression garment fitting/provision  Lymphedema therapeutic exercise  AROM/PROM/Strength/Coordination  Self-care training  Functional mobility training  Education in skin care and lymphedema precautions  Self-MLD education per home program  Self-bandaging education per home program  Caregiver education as needed  Wound care as needed     GOALS  Short term goals  Time frame: to be met by 9/10/2019  1. Patient will demonstrate knowledge of signs/symptoms of infections/cellulitis and be independent in skin care to prevent cellulitis. 2.  Patient will demonstrate independence in lymphedema home program of therapeutic exercises to improve circulation and decongest limb to improve ADLs. 3.  Patient will participate in the selection process to allow ordering of home compression system (daytime, nighttime garments and pump as needed). Long term goals  Time frame: to be met by 11/1/2019  1. Pt will show improvement in the [facility] Lymphedema Assessment Scale by decreasing the score to 4 out of 20 and thus allow improvement in patient's quality of life. 2.  Patient will be independent with don/doff of compression system and use in order to prevent reaccumulation of fluid at discharge. 3.  Pt will be independent in self-MLD and show stable limb volumes showing decongestion and pt. ready for transition to independent restorative phase of lymphedema therapy. Patient has participated in goal setting and agrees to work toward plan of care. Patient was instructed to call if questions or concerns arise. Thank you for this referral.  Jayden Pearl, PT, CLT   Time Calculation: 110 mins    TREATMENT PLAN EFFECTIVE DATES:   8/6/2019 TO 11/1/2019  I have read the above plan of care for Sebastián Mccollum. I certify the above prescribed services are required by this patient and are medically necessary.   The above plan of care has been developed in conjunction with the lymphedema/physical therapist.       Physician Signature: ____________________________________Date:______________     Dr. Dennis Tomlin, III

## 2019-08-27 ENCOUNTER — HOSPITAL ENCOUNTER (OUTPATIENT)
Dept: PHYSICAL THERAPY | Age: 74
Discharge: HOME | End: 2019-08-27
Payer: MEDICARE

## 2019-08-27 PROCEDURE — 97110 THERAPEUTIC EXERCISES: CPT

## 2019-08-27 PROCEDURE — 97140 MANUAL THERAPY 1/> REGIONS: CPT

## 2019-08-27 NOTE — PROGRESS NOTES
[facility]  Physical Therapy Lymphedema Clinic  [address], [address], [address]    LYmphedema Therapy  Visit: 2    [x]                  Daily note               []                 30 day/10th visit progress note    NAME: Jose Louis  DATE: 8/27/2019    GOALS  Short term goals  Time frame: to be met by 9/10/2019  1. Patient will demonstrate knowledge of signs/symptoms of infections/cellulitis and be independent in skin care to prevent cellulitis. Patient is performing daily skin care and continued education this visit in MLD techniques. 2.  Patient will demonstrate independence in lymphedema home program of therapeutic exercises to improve circulation and decongest limb to improve ADLs. Patient was educated in the Active ROM routine last visit and was educated in the Hope routine this visit. Patient instructed to perform one routine daily. 3.  Patient will participate in the selection process to allow ordering of home compression system (daytime, nighttime garments and pump as needed). Patient received the [employer] Lite arm sleeve and gauntlet and brought the garments to the clinic for fitting today. Discussed the role and benefit of the basic vaso-pneumatic pump and patient is interested in having a pump trial in the future. Will contact the vendor, Red Yoder, to set up the trial and to check insurance benefits. Will continue to monitor for additional needs.      Long term goals  Time frame: to be met by 11/1/2019  1. Pt will show improvement in the [facility] Lymphedema Assessment Scale by decreasing the score to 4 out of 20 and thus allow improvement in patient's quality of life. 2.  Patient will be independent with don/doff of compression system and use in order to prevent reaccumulation of fluid at discharge.  Patient was able to don and doff the arm sleeve and gauntlet in the clinic today with verbal cues for technique. Will continue to assess. 3.  Pt will be independent in self-MLD and show stable limb volumes showing decongestion and pt. ready for transition to independent restorative phase of lymphedema therapy. L UE volumetric measurements taken today reveal an increase of 32.68 mL since last seen in the clinic on 8/6/2019. SUBJECTIVE REPORT:  Patient reports that she noticed an increase in L UE swelling, especially near the forearm, after mowing the yard and doing yard work in the heat. The swelling has improved but patient is anxious to try wearing the new compression garments for management of swelling. Pain:   Patient complains of intermittent nerve pain that radiates down the left arm. The pain interferes with her sleep. She was prescribed Gabapentin which she has taken once for pain relief. Gait:    [x]  Independent gait without any assistive device for community distances  ADLs:  Independent for bathing and dressing. Treatment Response: Patient has been performing daily skin care and performing the Active ROM routine at home. She had questions on the routine which were answered today. []   Patient reviewed packet received at evaluation  []   Patient completed home program as prescribed  []   Patient not fully compliant with home program to date  [x]   Patient has received one set of compression garments for the L UE and the initial fit looks good. Function: Patient is retired and remains active. She enjoys doing yard work and mowing the lawn.  She does not participate in an exercise program.   []   Patient requiring less assistance with completion of home program  []   ADLs are requiring less assistance   []   Patient able to return to work/leisure activities  [place] Lymphedema Assessment Scale:  Deferred  Weight: Deferred  TREATMENT AND OBJECTIVE DATA SUMMARY:   Patient/Family Education:      Educated in skin care: [x]   Skin care products  []   Hygiene  [x] Prevention of cellulitis  []   Wound care     Educated in exercise: [x]   Walking program  [x]   Sadia ball routine  []   Stick routine  [x]   ROM routine     Instructed in self MLD:   Written sequence given and reviewed with patient as well as demonstration and instruction during MLD portion of the session. Instructed in don/doff of compression system:   []   Multi layer bandage (MLB) donning principles and wear precautions  [x]   Day garments - patient has received one set of day garments and was able to demonstrate donning and doffing of the garments in the clinic. Patient was educated in the care of, precautions, and wear schedule of the garments. []   Night garments     Therapeutic Activity 0 minutes   Treatment time: N/A  Functional Mobility: Independent with gait and ADL's. Retired. Drives. Fall risk: Low     Therapeutic Exercise/Procedure 15 minutes   Treatment time: 11:30 - 11:45 am  [employer] exercise program: Patient was able to perform 5 reps of the [employer] routine as demonstrated by the therapist in the clinic today. Provided patient with the written instructions for the routine and how to obtain a [employer] for home use. Stick exercise program: N/A   Free exercises/ROM: Patient has been educated in the Active ROM routine and has the written HEP to follow. Home program: Patient to perform daily to BID:  [x]   Skin care  [x]   Deep abdominal breathing  [x]   Exercise routine  [x]   Walking program  [x]   Rest in supine   []   Compression bandage  [x]   Compression garments with high risk activities and/or daily as tolerated. Remove at night for laundering.   []   Vasopneumatic device  []   Wound care  [x]   Self MLD  [x]   Bring supplies to each therapy visit  []   Purchase necessary supplies  []   Weight loss program  []   Follow up with       Rationale: Exercise will increase the lymph angiomotoricity and tissue pressure of the skin and thus decrease swelling.      Modalities 0 minutes   Treatment time: N/A  Vasopneumatic pump: Discussed the role and benefit of the basic vaso-pneumatic pump and patient is interested in having a pump trial in the future. Will contact the vendor, Red Yoder, to set up the trial and to check insurance benefits. Manual Lymphatic Drainage (MLD) 60 minutes   Treatment time: 10:30 - 11:30 am  Area to decongest:    [x] UE          []  Right     [x]  Left      [] Trunk      [] LE             []  Right     []  Left      [] Trunk         Sequence used and effectiveness: [x] Secondary sequence for upper extremity with / without trunk involvement    [] Secondary/Primary sequence for lower extremities with / without trunk involvement     [x] Modifications were made to manual lymph drainage sequence to exclude cervical techniques secondary to patient's age    [x] Self MLD sequence/techniques/hand strokes - patient was educated in the self MLD packet during MLD treatment in the clinic. Patient provided with the written packet to follow. Skin/wound care/debridement: Skin is intact with mild forearm and wrist swelling apparent. Upper/Lower extremity compression: Patient received the [employer] Lite arm sleeve with silicone top band size small/long and the gauntlet size small, 15-20 mmHg color:beige from the vendor, [employer]. Patient brought the garments to the clinic for fitting and the initial fit is good and the garments are comfortable to the patient. Patient is concerned that she may not be able to tolerate wearing the garments for long periods due to issues with neurogenic pain. Patient was educated in wearing the garments at least with high risk activities, such as airline travel and yard work etc. which has previously exacerbated swelling in her left arm. Patient was educated in donning and doffing, precautions, wear schedule and care of the garments.  Patient will wash and wear the garments as tolerated for one week and call the clinic with any garment issues. Kinesiotaping: Deferred   Girth/Volume measurement: L UE volumetric measurements taken today reveal an increase of 32.68 mL since last seen in the clinic on 8/6/2019. TOTAL TREATMENT 75 mins     Circumferential Limb Measurements:  L UE volumetric measurements were taken today. See measurements above. ASSESSMENT:   Treatment effectiveness and tolerance: Patient continues to have L UE swelling, especially near the forearm, which is exacerbated with yard work and the summer temperatures. Patient received one set of day garments and the initial fit is good in the clinic. Patient continues to have intermittent pain that radiates down the L UE which may limit patient's ability to tolerate compression garments or use of a home vaso-pneumatic pump. Patient would benefit from a pump trial in the clinic to assess patient's tolerance to the pump as patient would benefit from a home pump for long term management of lymphedema. Progress toward goals: See goal section of note. PLAN OF CARE:   Changes to the plan of care: Continue per plan of care established on evaluation. Frequency: []  2 times a week  []  Weekly  [x]  Biweekly   []  Monthly   Other: Vendor:  [employer]       April Chely PT, CLT

## 2019-09-25 ENCOUNTER — HOSPITAL ENCOUNTER (OUTPATIENT)
Dept: PHYSICAL THERAPY | Age: 74
Discharge: HOME | End: 2019-09-25
Payer: MEDICARE

## 2019-09-25 PROCEDURE — 97140 MANUAL THERAPY 1/> REGIONS: CPT

## 2019-09-25 PROCEDURE — 97110 THERAPEUTIC EXERCISES: CPT

## 2019-09-25 PROCEDURE — 97016 VASOPNEUMATIC DEVICE THERAPY: CPT

## 2019-09-25 NOTE — PROGRESS NOTES
[facility]  Physical Therapy Lymphedema Clinic  [address], [address] [facility], [facility] [address]    LYmphedema Therapy  Visit: 3    []                  Daily note               [x]                 30 day progress note with Discharge    NAME: Sebastián Mccollum  DATE: 9/25/2019    GOALS  Short term goals  Time frame: to be met by 9/10/2019  1. Patient will demonstrate knowledge of signs/symptoms of infections/cellulitis and be independent in skin care to prevent cellulitis. Patient has been educated in daily skin care with a low Ph lotion using MLD techniques and signs and symptoms of cellulitis. Goal met 9/25/2019.   2.  Patient will demonstrate independence in lymphedema home program of therapeutic exercises to improve circulation and decongest limb to improve ADLs. Patient has been educated in the Active ROM and continued education in the [employer] routine this visit. Patient instructed to perform one routine daily. Goal met 9/25/2019.   3.  Patient will participate in the selection process to allow ordering of home compression system (daytime, nighttime garments and pump as needed). Patient has received one [address] arm sleeve and gauntlet and will call the vendor when ready to order a second set of garments. Patient had a vaso-pneumatic pump trial with a rep from [facility] in the clinic today. She was unable to tolerate a trial of the Airos vaso-pneumatic pump on the lowest pressure setting of 30 mmHg secondary to aggravation of the nerve pain in her left elbow and forearm. Patient had a 15 to 20 minute trial of the Biomedical vaso-pneumatic pump with a pressure setting of 20 mmHg which was more comfortable to the patient. Insurance authorization is pending and patient will call the vendor if she would like to pursue the pump for home use. Goal met 9/25/2019.       Long term goals  Time frame: to be met by 11/1/2019  1.   Pt will show improvement in the [facility] [place] Lymphedema Assessment Scale by decreasing the score to 4 out of 20 and thus allow improvement in patient's quality of life. Patient scores a 2 out of 20 on the [employer] Lymphedema Assessment Scale. Goal met 9/25/2019.   2.  Patient will be independent with don/doff of compression system and use in order to prevent reaccumulation of fluid at discharge. Patient is independently donning and doffing the arm sleeve and gauntlet. Goal met 9/25/2019. 3.  Pt will be independent in self-MLD and show stable limb volumes showing decongestion and pt. ready for transition to independent restorative phase of lymphedema therapy. Full volumetric measurements taken today reveal a loss of 75 mL in the involved extremity and a gain of 17 mL in the uninvolved extremity since her last visit on 8/27/2019. Patient has been educated in the self MLD packet and has the written instructions to follow. Goal met 9/25/2019. SUBJECTIVE REPORT:  Patient reports that she is wearing her arm sleeve with high risk activities and the gauntlet a bit less frequently. She has not noticed hand or finger swelling and feels that the swelling in her L arm has improved. She is not interested in ordering a second set of garments but will call the vendor when she is ready. Pain:   Patient complains of nerve pain near the L elbow and forearm with use of the Airos pump. Pain improves once the pump is exchanged for the Biomedical vaso-pneumatic pump which allows for a lower pressure setting. .            Gait:    [x]  Independent gait without any assistive device for community distances  ADLs:  Independent for bathing and dressing. Treatment Response: Patient is wearing the [employer] Lite arm sleeve with high risk activities but the gauntlet less frequently. L UE volumetric measurements reveal a decrease of 75 mL since last visit with less forearm and wrist swelling noted.    [x]   Patient reviewed packet received at evaluation  [x]   Patient completed home program as prescribed  []   Patient not fully compliant with home program to date  [x]   Patient has received one set of compression garments for the L UE. She will call the vendor when she is ready to order a second set of garments. Function: Patient is retired and remains active. She enjoys doing yard work and mowing the lawn. She does not participate in an exercise program.   [x]   Patient requiring less assistance with completion of home program  []   ADLs are requiring less assistance   [x]   Patient able to return to work/leisure activities  [facility] Lymphedema Assessment Scale:  Scores a 2 out of 20. Weight: 132.0 lb this visit. TREATMENT AND OBJECTIVE DATA SUMMARY:   Patient/Family Education:      Educated in skin care: [x]   Skin care products  []   Hygiene  [x]  Prevention of cellulitis  []   Wound care     Educated in exercise: [x]   Walking program  [x]   Sadia ball routine  []   Stick routine  [x]   ROM routine     Instructed in self MLD:   Patient has been educated in the self MLD packet and MLD techniques with bathing and skin care. Instructed in don/doff of compression system:   []   Multi layer bandage (MLB) donning principles and wear precautions  [x]   Day garments - patient has received one set of day garments and was able to demonstrate donning and doffing of the garments in the clinic. Patient has been educated in the care of, precautions, and wear schedule of the garments. [x]   Night garments - provided samples of night time garments for patient in the clinic today. Therapeutic Activity 0 minutes   Treatment time: N/A  Functional Mobility: Independent with gait and ADL's. Retired. Drives.     Fall risk: Low     Therapeutic Exercise/Procedure 10 minutes   Treatment time: 9:20 - 9:30 am  Sadia ball exercise program: Continued education in the Hope routine with patient able to perform 5 reps of the routine with modifications secondary to nerve pain in the L forearm and wrist area. Patient has the written HEP to follow. Stick exercise program: Deferred   Free exercises/ROM: Patient has been educated in the Active ROM routine and has the written HEP to follow. Home program: Patient to perform daily to BID:  [x]   Skin care  [x]   Deep abdominal breathing  [x]   Exercise routine  [x]   Walking program  [x]   Rest in supine   []   Compression bandage  [x]   Compression garments with high risk activities and/or daily as tolerated. Remove at night for laundering. [x]   Vasopneumatic device - trial completed in the clinic today.   []   Wound care  [x]   Self MLD  [x]   Bring supplies to each therapy visit  []   Purchase necessary supplies  []   Weight loss program  []   Follow up with       Rationale: Exercise will increase the lymph angiomotoricity and tissue pressure of the skin and thus decrease swelling. Modalities 30 minutes   Treatment time: 8:30 - 9:00 am   Vasopneumatic pump: A rep from [address] [place] [address] was present to provide a pump trial for patient in the clinic today. Initially patient had a trial of the Airos vaso-pneumatic pump on the lowest pressure setting of 30 mmHg. The Airos pump trial was stopped after 5 minutes as patient reported exacerbation of L elbow and forearm nerve pain with pump use. The pump was exchanged for a Biomedical vaso-pneumatic pump and patient was able to comfortably tolerate a 15-20 minute trial on a lower pressure setting of 20 mmHg. Pre and post pump girth measurements of the L UE taken today revealed a decrease in 5 out of 6 measurements. See girth measurements below. Patient will call the vendor if she would like to pursue the pump for home use.      Manual Lymphatic Drainage (MLD) 30 minutes   Treatment time: 8:20 - 8:30 am, 9:00-9:20 am   Area to decongest:    [x] UE          []  Right     [x]  Left      [] Trunk      [] LE             []  Right     [] Left      [] Trunk         Sequence used and effectiveness: [] Secondary sequence for upper extremity with / without trunk involvement    [] Secondary/Primary sequence for lower extremities with / without trunk involvement     [x] Modifications were made to manual lymph drainage sequence to exclude cervical techniques secondary to patient's age    [x] Self MLD sequence/techniques/hand strokes - patient has been educated in the self MLD packet and has with the written packet to follow. Skin/wound care/debridement: Skin is intact. Less left forearm and wrist swelling present. Upper/Lower extremity compression: Patient has one set of day garments:  [employer] Lite arm sleeve with silicone top band size small/long and the gauntlet size small, 15-20 mmHg color:beige from the vendor, [employer]. Patient is wearing the arm sleeve arm sleeve with high risk activities but the gauntlet less frequently. She is aware to be more consistent with gauntlet use with any increase in hand or finger swelling. Discussed the life expectancy of compression garments with patient. She is not ready to order a second set of garments at this time but will contact the vendor when she is ready to order a second set. Night garments - deferred by patient. Kinesiotaping: Deferred   Girth/Volume measurement: Full volumetric measurements taken today reveal a loss of 75 mL in the involved extremity and a gain of 17 mL in the uninvolved extremity since her last visit on 8/27/2019. Affected Side: L UE pre and post pump (in bold) girth measurements. Left (cm)   Base 3rd finger 5.7  5.6   Palm 18.5  18   Wrist 15.7  15.7   Mid Forearm 22.4  22   Elbow 23  22.7   Axilla 29.8  28          TOTAL TREATMENT 70 mins     Circumferential Limb Measurements:  L UE volumetric measurements were taken today. See measurements above.      ASSESSMENT:   Treatment effectiveness and tolerance: L UE volumetric measurements take today reveal a loss of 75 mL and a decrease in percentage difference from 4.69% to 1.67% since last visit. Patient is wearing day time garments as needed and is able to tolerate the compression of 15 to 20 mmHg without aggravation of L UE nerve pain. Patient had a trial of two vaso-pneumatic pumps in the clinic today. She was unable to tolerate the first pump since the lowest pressure setting of 30 mmHg aggravated nerve pain in the L UE but was much more comfortable in the second pump on a lower pressure of 20 mmHg. Patient prefers to call the vendor when she is ready to order a pump for home use. Patient is doing well with her home program of CDT and has met all goals set on the initial evaluation. She will be discharged to the restorative phase of care and has the education for managing lymphedema in the home setting. Patient is not interested in making a 6 month follow up visit but will call the clinic in the future with any issues. Progress toward goals: Patient has met all STG's and LTG's. PLAN OF CARE:   Changes to the plan of care: Patient will be discharged to the restorative phase of care. Frequency: []  2 times a week  []  Weekly  []  Biweekly   [x]  Discharge - will follow up in the clinic as needed. Other: Vendor:  [employer]       Thurl Foots, PT, CLT

## 2019-10-07 NOTE — PATIENT INSTRUCTIONS
Medicare Wellness Visit, Female The best way to live healthy is to have a lifestyle where you eat a well-balanced diet, exercise regularly, limit alcohol use, and quit all forms of tobacco/nicotine, if applicable. Regular preventive services are another way to keep healthy. Preventive services (vaccines, screening tests, monitoring & exams) can help personalize your care plan, which helps you manage your own care. Screening tests can find health problems at the earliest stages, when they are easiest to treat. [facility] follows the current, evidence-based guidelines published by the [facility] Caleb Jimenez ([facility]) when recommending preventive services for our patients. Because we follow these guidelines, sometimes recommendations change over time as research supports it. (For example, mammograms used to be recommended annually. Even though Medicare will still pay for an annual mammogram, the newer guidelines recommend a mammogram every two years for women of average risk.) Of course, you and your doctor may decide to screen more often for some diseases, based on your risk and your health status. Preventive services for you include: - Medicare offers their members a free annual wellness visit, which is time for you and your primary care provider to discuss and plan for your preventive service needs. Take advantage of this benefit every year! 
-All adults over the age of 72 should receive the recommended pneumonia vaccines. Current USPSTF guidelines recommend a series of two vaccines for the best pneumonia protection.  
-All adults should have a flu vaccine yearly and a tetanus vaccine every 10 years. All adults age 61 and older should receive a shingles vaccine once in their lifetime.   
-A bone mass density test is recommended when a woman turns 65 to screen for osteoporosis. This test is only recommended one time, as a screening. Some providers will use this same test as a disease monitoring tool if you already have osteoporosis. -All adults age 38-68 who are overweight should have a diabetes screening test once every three years.  
-Other screening tests and preventive services for persons with diabetes include: an eye exam to screen for diabetic retinopathy, a kidney function test, a foot exam, and stricter control over your cholesterol.  
-Cardiovascular screening for adults with routine risk involves an electrocardiogram (ECG) at intervals determined by your doctor.  
-Colorectal cancer screenings should be done for adults age 54-65 with no increased risk factors for colorectal cancer. There are a number of acceptable methods of screening for this type of cancer. Each test has its own benefits and drawbacks. Discuss with your doctor what is most appropriate for you during your annual wellness visit. The different tests include: colonoscopy (considered the best screening method), a fecal occult blood test, a fecal DNA test, and sigmoidoscopy. -Breast cancer screenings are recommended every other year for women of normal risk, age 54-69. 
-Cervical cancer screenings for women over age 72 are only recommended with certain risk factors.  
-All adults born between [facility] should be screened once for Hepatitis C. Here is a list of your current Health Maintenance items (your personalized list of preventive services) with a due date: 
Health Maintenance Due Topic Date Due  Glaucoma Screening   05/31/2018  Flu Vaccine  08/01/2019 Raji Annual Well Visit  10/09/2019 Thumb Arthritis: Exercises Introduction Here are some examples of exercises for you to try. The exercises may be suggested for a condition or for rehabilitation. Start each exercise slowly. Ease off the exercises if you start to have pain. You will be told when to start these exercises and which ones will work best for you. How to do the exercises Thumb IP flexion 1. Place your forearm and hand on a table with your affected thumb pointing up. 2. With your other hand, hold your thumb steady just below the joint nearest your thumbnail. 3. Bend the tip of your thumb downward, then straighten it. 4. Repeat 8 to 12 times. 5. Switch hands and repeat steps 1 through 4, even if only one thumb is sore. Thumb MP flexion 1. Place your forearm and hand on a table with your affected thumb pointing up. 2. With your other hand, hold the base of your thumb and palm steady. 3. Bend your thumb downward where it meets your palm, then straighten it. 4. Repeat 8 to 12 times. 5. Switch hands and repeat steps 1 through 4, even if only one thumb is sore. Thumb opposition 1. With your affected hand, point your fingers and thumb straight up. Your wrist should be relaxed, following the line of your fingers and thumb. 2. Touch your affected thumb to each finger, one finger at a time. This will look like an \"okay\" sign, but try to keep your other fingers straight and pointing upward as much as you can. 3. Repeat 8 to 12 times. 4. Switch hands and repeat steps 1 through 3, even if only one thumb is sore. Follow-up care is a key part of your treatment and safety. Be sure to make and go to all appointments, and call your doctor if you are having problems. It's also a good idea to know your test results and keep a list of the medicines you take. Where can you learn more? Go to http://ludwin-leonard.info/. Enter Q920 in the search box to learn more about \"Thumb Arthritis: Exercises. \" Current as of: June 26, 2019 Content Version: 12.2 © 3646-7462 [employer]. Care instructions adapted under license by "[employer]" (which disclaims liability or warranty for this information).  If you have questions about a medical condition or this instruction, always ask your healthcare professional. [employer] disclaims any warranty or liability for your use of this information.

## 2019-10-09 ENCOUNTER — OFFICE VISIT (OUTPATIENT)
Dept: INTERNAL MEDICINE CLINIC | Age: 74
End: 2019-10-09

## 2019-10-09 VITALS
SYSTOLIC BLOOD PRESSURE: 121 MMHG | HEART RATE: 65 BPM | RESPIRATION RATE: 14 BRPM | TEMPERATURE: 97.9 F | BODY MASS INDEX: 21.83 KG/M2 | HEIGHT: 65 IN | DIASTOLIC BLOOD PRESSURE: 75 MMHG | OXYGEN SATURATION: 98 % | WEIGHT: 131 LBS

## 2019-10-09 DIAGNOSIS — C43.61 MALIGNANT MELANOMA OF RIGHT UPPER EXTREMITY INCLUDING SHOULDER (HCC): ICD-10-CM

## 2019-10-09 DIAGNOSIS — E78.2 MIXED HYPERLIPIDEMIA: ICD-10-CM

## 2019-10-09 DIAGNOSIS — E55.9 VITAMIN D DEFICIENCY: ICD-10-CM

## 2019-10-09 DIAGNOSIS — M18.0 PRIMARY OSTEOARTHRITIS OF BOTH FIRST CARPOMETACARPAL JOINTS: ICD-10-CM

## 2019-10-09 DIAGNOSIS — Z00.00 MEDICARE ANNUAL WELLNESS VISIT, SUBSEQUENT: Primary | ICD-10-CM

## 2019-10-09 RX ORDER — VALACYCLOVIR HYDROCHLORIDE 1 G/1
1000 TABLET, FILM COATED ORAL DAILY
Qty: 90 TAB | Refills: 0 | Status: SHIPPED | OUTPATIENT
Start: 2019-10-09 | End: 2020-06-07

## 2019-10-09 NOTE — PROGRESS NOTES
This is the Subsequent Medicare Annual Wellness Exam, performed 12 months or more after the Initial AWV or the last Subsequent AWV    I have reviewed the patient's medical history in detail and updated the computerized patient record. As well as a follow-up of her health issues. She does have ongoing pain in both of her hands. Some lower back pain, some knee pain, and some hip pain. She continues to be active in her yard. She is up-to-date on visits with the gynecologist, mammograms, and eye doctors. History     Past Medical History:   Diagnosis Date    Endometriosis     HSV-2 infection     Skin cancer     melanoma x 4      Past Surgical History:   Procedure Laterality Date    HX COLONOSCOPY  09/26/2017    10 yr f/u - Dr. Isaac Claude HX HYSTERECTOMY      HX Kushal Pean HX ORTHOPAEDIC  10/2012    Foot surgery    NH BETTYULATCLAIRE SHLDR JT W ANESTHESIA      XR DEXA BONE DENSITY STUDY AXIAL  04.2009     Current Outpatient Medications   Medication Sig Dispense Refill    valACYclovir (VALTREX) 1 gram tablet Take 1 Tab by mouth daily. 90 Tab 0    gabapentin (NEURONTIN) 100 mg capsule Take 1 Cap by mouth three (3) times daily as needed for Pain. Max Daily Amount: 300 mg. 60 Cap 1    MAGNESIUM CITRATE PO Take 250 mg by mouth.  TURMERIC, BULK, by Does Not Apply route.  Cholecalciferol, Vitamin D3, (VITAMIN D3) 1,000 unit cap Take  by mouth.  fexofenadine (ALLEGRA) 180 mg tablet Take 1 Tab by mouth daily. 90 Tab 3    metronidazole (METROCREAM) 0.75 % topical cream Apply  to affected area two (2) times a day.  Use a thin layer to affected areas after washing        No Known Allergies  Family History   Problem Relation Age of Onset    Heart Disease Mother     Cancer Father         Prostate and skin    Heart Disease Father         CAD    Stroke Father     Cancer Brother         Melanoma    Cancer Daughter         Melanoma    Cancer Brother         Skin Social History     Tobacco Use    Smoking status: Former Smoker     Packs/day: 0.25     Years: 11.00     Pack years: 2.75     Last attempt to quit: 1973     Years since quittin.6    Smokeless tobacco: Never Used   Substance Use Topics    Alcohol use: No     Patient Active Problem List   Diagnosis Code    Endometriosis 18    HSV-2 infection B00.9    Mixed hyperlipidemia E78.2    Melanoma ([address] C43.9    Advance directive discussed with patient Z70.80    Vitamin D deficiency E55.9   ROS - Per HPI    Physical Examination: General appearance - alert, well appearing, and in no distress  Eyes - pupils equal and reactive, extraocular eye movements intact  Ears - bilateral TM's and external ear canals normal  Nose - normal and patent, no erythema, discharge or polyps  Mouth - mucous membranes moist, pharynx normal without lesions  Neck - supple, no significant adenopathy  Lymphatics - no palpable lymphadenopathy, no hepatosplenomegaly  Chest - clear to auscultation, no wheezes, rales or rhonchi, symmetric air entry  Heart - normal rate, regular rhythm, normal S1, S2, no murmurs, rubs, clicks or gallops  Abdomen - soft, nontender, nondistended, no masses or organomegaly  Neurological - alert, oriented, normal speech, no focal findings or movement disorder noted  Musculoskeletal - no joint tenderness, deformity or swelling. OA changes in both thumbs. Extremities - peripheral pulses normal, no pedal edema, no clubbing or cyanosis      Depression Risk Factor Screening:     3 most recent PHQ Screens 2019   Little interest or pleasure in doing things Not at all   Feeling down, depressed, irritable, or hopeless Not at all   Total Score PHQ 2 0     Alcohol Risk Factor Screening: You do not drink alcohol or very rarely. Functional Ability and Level of Safety:   Hearing Loss  Hearing is good. Activities of Daily Living  The home contains: no safety equipment.   Patient does total self care    Fall Risk  Fall Risk Assessment, last 12 mths 7/12/2019   Able to walk? Yes   Fall in past 12 months? No       Abuse Screen  Patient is not abused    Cognitive Screening   Evaluation of Cognitive Function:  Has your family/caregiver stated any concerns about your memory: no      Patient Care Team   Patient Care Team:  Deepa De Jesus MD as PCP - Clau Jovel MD (Ophthalmology)  Harley Lobo MD (Obstetrics & Gynecology)    Assessment/Plan   Education and counseling provided:  Are appropriate based on today's review and evaluation  Influenza Vaccine  Screening Mammography  Diabetes screening test    Diagnoses and all orders for this visit:    1. Malignant melanoma of right upper extremity including shoulder (HCC) -continues to see dermatology every 6 months or so. 2. Vitamin D deficiency -repeat levels today to be sure that they have met goal of 30.  -     VITAMIN D, 25 HYDROXY    3. Mixed hyperlipidemia -diet controlled. Continue to monitor to be sure that is adequate. -     CBC WITH AUTOMATED DIFF  -     METABOLIC PANEL, COMPREHENSIVE  -     LIPID PANEL  -     TSH RFX ON ABNORMAL TO FREE T4    4. Medicare annual wellness visit, subsequent    5. Osteoarthritis of both thumbswe discussed braces and Tylenol and she will consider those. We discussed an orthopedic evaluation and she will consider if the pain worsens. Other orders  -     valACYclovir (VALTREX) 1 gram tablet; Take 1 Tab by mouth daily.         Health Maintenance Due   Topic Date Due    GLAUCOMA SCREENING Q2Y  05/31/2018    Influenza Age 5 to Adult  08/01/2019    MEDICARE YEARLY EXAM  10/09/2019

## 2019-12-24 LAB
25(OH)D3+25(OH)D2 SERPL-MCNC: 29.6 NG/ML (ref 30–100)
ALBUMIN SERPL-MCNC: 4.4 G/DL (ref 3.5–4.8)
ALBUMIN/GLOB SERPL: 1.8 {RATIO} (ref 1.2–2.2)
ALP SERPL-CCNC: 93 IU/L (ref 39–117)
ALT SERPL-CCNC: 24 IU/L (ref 0–32)
AST SERPL-CCNC: 26 IU/L (ref 0–40)
BASOPHILS # BLD AUTO: 0.1 X10E3/UL (ref 0–0.2)
BASOPHILS NFR BLD AUTO: 1 %
BILIRUB SERPL-MCNC: 0.3 MG/DL (ref 0–1.2)
BUN SERPL-MCNC: 12 MG/DL (ref 8–27)
BUN/CREAT SERPL: 14 (ref 12–28)
CALCIUM SERPL-MCNC: 9.5 MG/DL (ref 8.7–10.3)
CHLORIDE SERPL-SCNC: 104 MMOL/L (ref 96–106)
CHOLEST SERPL-MCNC: 223 MG/DL (ref 100–199)
CO2 SERPL-SCNC: 23 MMOL/L (ref 20–29)
CREAT SERPL-MCNC: 0.86 MG/DL (ref 0.57–1)
EOSINOPHIL # BLD AUTO: 0.1 X10E3/UL (ref 0–0.4)
EOSINOPHIL NFR BLD AUTO: 2 %
ERYTHROCYTE [DISTWIDTH] IN BLOOD BY AUTOMATED COUNT: 12 % (ref 12.3–15.4)
GLOBULIN SER CALC-MCNC: 2.4 G/DL (ref 1.5–4.5)
GLUCOSE SERPL-MCNC: 85 MG/DL (ref 65–99)
HCT VFR BLD AUTO: 39.3 % (ref 34–46.6)
HDLC SERPL-MCNC: 70 MG/DL
HGB BLD-MCNC: 13.8 G/DL (ref 11.1–15.9)
IMM GRANULOCYTES # BLD AUTO: 0 X10E3/UL (ref 0–0.1)
IMM GRANULOCYTES NFR BLD AUTO: 0 %
LDLC SERPL CALC-MCNC: 140 MG/DL (ref 0–99)
LYMPHOCYTES # BLD AUTO: 3 X10E3/UL (ref 0.7–3.1)
LYMPHOCYTES NFR BLD AUTO: 52 %
MCH RBC QN AUTO: 32.2 PG (ref 26.6–33)
MCHC RBC AUTO-ENTMCNC: 35.1 G/DL (ref 31.5–35.7)
MCV RBC AUTO: 92 FL (ref 79–97)
MONOCYTES # BLD AUTO: 0.4 X10E3/UL (ref 0.1–0.9)
MONOCYTES NFR BLD AUTO: 7 %
NEUTROPHILS # BLD AUTO: 2.2 X10E3/UL (ref 1.4–7)
NEUTROPHILS NFR BLD AUTO: 38 %
PLATELET # BLD AUTO: 261 X10E3/UL (ref 150–450)
POTASSIUM SERPL-SCNC: 4.5 MMOL/L (ref 3.5–5.2)
PROT SERPL-MCNC: 6.8 G/DL (ref 6–8.5)
RBC # BLD AUTO: 4.28 X10E6/UL (ref 3.77–5.28)
SODIUM SERPL-SCNC: 142 MMOL/L (ref 134–144)
TRIGL SERPL-MCNC: 66 MG/DL (ref 0–149)
TSH SERPL DL<=0.005 MIU/L-ACNC: 2.36 UIU/ML (ref 0.45–4.5)
VLDLC SERPL CALC-MCNC: 13 MG/DL (ref 5–40)
WBC # BLD AUTO: 5.8 X10E3/UL (ref 3.4–10.8)

## 2020-01-06 ENCOUNTER — OFFICE VISIT (OUTPATIENT)
Dept: INTERNAL MEDICINE CLINIC | Age: 75
End: 2020-01-06

## 2020-01-06 VITALS
WEIGHT: 132.4 LBS | SYSTOLIC BLOOD PRESSURE: 132 MMHG | BODY MASS INDEX: 22.06 KG/M2 | RESPIRATION RATE: 16 BRPM | TEMPERATURE: 98 F | HEART RATE: 74 BPM | HEIGHT: 65 IN | OXYGEN SATURATION: 98 % | DIASTOLIC BLOOD PRESSURE: 82 MMHG

## 2020-01-06 DIAGNOSIS — M75.02 ADHESIVE CAPSULITIS OF LEFT SHOULDER: Primary | ICD-10-CM

## 2020-01-06 NOTE — PATIENT INSTRUCTIONS
Frozen Shoulder: Exercises  Introduction  Here are some examples of exercises for you to try. The exercises may be suggested for a condition or for rehabilitation. Start each exercise slowly. Ease off the exercises if you start to have pain. You will be told when to start these exercises and which ones will work best for you. How to do the exercises  Neck stretches    1. Look straight ahead, and tip your right ear to your right shoulder. Do not let your left shoulder rise up as you tip your head to the right. 2. Hold 15 to 30 seconds. 3. Tilt your head to the left. Do not let your right shoulder rise up as you tip your head to the left. 4. Hold for 15 to 30 seconds. 5. Repeat 2 to 4 times to each side. Shoulder rolls    1. Sit comfortably with your feet shoulder-width apart. You can also do this exercise while standing. 2. Roll your shoulders up, then back, and then down in a smooth, circular motion. 3. Repeat 2 to 4 times. Shoulder flexion (lying down)    1. Lie on your back, holding a wand with your hands. Your palms should face down as you hold the wand. Place your hands slightly wider than your shoulders. 2. Keeping your elbows straight, slowly raise your arms over your head until you feel a stretch in your shoulders, upper back, and chest.  3. Hold 15 to 30 seconds. 4. Repeat 2 to 4 times. Shoulder rotation (lying down)    1. Lie on your back and hold a wand in both hands with your elbows bent and your palms up. 2. Keeping your elbows close to your body, move the wand across your body toward the arm that has pain. 3. Hold for 15 to 30 seconds. 4. Repeat 2 to 4 times. Shoulder internal rotation with towel    1. Roll up a towel lengthwise. Hold the towel above and behind your head with the arm that is not sore. 2. With your sore arm, reach behind your back and grasp the towel.   3. Using the arm above your head, pull the towel upward until you feel a stretch on the front and outside of your sore shoulder. 4. Hold 15 to 30 seconds. 5. Relax and move the towel back down to the starting position. 6. Repeat 2 to 4 times. Shoulder blade squeeze    1. While standing with your arms at your sides, squeeze your shoulder blades together. Do not raise your shoulders up as you are squeezing. 2. Hold for 6 seconds. 3. Repeat 8 to 12 times. Follow-up care is a key part of your treatment and safety. Be sure to make and go to all appointments, and call your doctor if you are having problems. It's also a good idea to know your test results and keep a list of the medicines you take. Where can you learn more? Go to http://ludwin-leonard.info/. Enter R144 in the search box to learn more about \"Frozen Shoulder: Exercises. \"  Current as of: June 26, 2019  Content Version: 12.2  © 1359-1127 [employer]. Care instructions adapted under license by [employer] (which disclaims liability or warranty for this information). If you have questions about a medical condition or this instruction, always ask your healthcare professional. [place] [address] any warranty or liability for your use of this information.

## 2020-01-06 NOTE — PROGRESS NOTES
HPI:  Althea Nieves is a 66y.o. year old female who is here for a 1 month history of pain in her left shoulder and upper arm. She apparently fell on a leaf blower when she was working in the yard and had acute onset of pain in the left shoulder. Initially it seemed to have improved but then is worsened over the last few weeks. Hurts to move her arm. The pain goes from left shoulder into the left upper arm. There is no swelling. No previous bruising. No numbness, tingling, or weakness. She has had a previous history of right shoulder surgery for frozen shoulder. She has not tried taking any ibuprofen or Tylenol. She does have a history of lymphedema in the left arm. She wanted to discuss her recent labs as well with her sodium slightly high and potassium high normal.  Her TSH she wondered if it was increasing but is similar to her previous readings. Past Medical History:   Diagnosis Date    Endometriosis     HSV-2 infection     Skin cancer     melanoma x 4       Past Surgical History:   Procedure Laterality Date    HX COLONOSCOPY  09/26/2017    10 yr f/u - Dr. Leonel Perry HX ORTHOPAEDIC  10/2012    Foot surgery    [place] [facility] [employer]      XR DEXA BONE DENSITY STUDY AXIAL  04.2009       Prior to Admission medications    Medication Sig Start Date End Date Taking? Authorizing Provider   fexofenadine (ALLEGRA) 180 mg tablet Take 1 Tab by mouth daily. 7/13/11  Yes Ney Rhodes MD   valACYclovir (VALTREX) 1 gram tablet Take 1 Tab by mouth daily. 10/9/19   Ney Rhodes MD   gabapentin (NEURONTIN) 100 mg capsule Take 1 Cap by mouth three (3) times daily as needed for Pain. Max Daily Amount: 300 mg. 7/12/19   Nasir Barreto III, MD   MAGNESIUM CITRATE PO Take 250 mg by mouth. Provider, Historical   TURMERIC, BULK, by Does Not Apply route.     Provider, Historical   Cholecalciferol, Vitamin D3, (VITAMIN D3) 1,000 unit cap Take  by mouth. Provider, Historical   metronidazole (METROCREAM) 0.75 % topical cream Apply  to affected area two (2) times a day.  Use a thin layer to affected areas after washing     Provider, Historical       Social History     Socioeconomic History    Marital status: UNKNOWN     Spouse name: Not on file    Number of children: Not on file    Years of education: Not on file    Highest education level: Not on file   Occupational History    Not on file   Social Needs    Financial resource strain: Not on file    Food insecurity:     Worry: Not on file     Inability: Not on file    Transportation needs:     Medical: Not on file     Non-medical: Not on file   Tobacco Use    Smoking status: Former Smoker     Packs/day: 0.25     Years: 11.00     Pack years: 2.75     Last attempt to quit: 1973     Years since quittin.8    Smokeless tobacco: Never Used   Substance and Sexual Activity    Alcohol use: No    Drug use: No    Sexual activity: Not on file   Lifestyle    Physical activity:     Days per week: Not on file     Minutes per session: Not on file    Stress: Not on file   Relationships    Social connections:     Talks on phone: Not on file     Gets together: Not on file     Attends [employer] service: Not on file     Active member of club or organization: Not on file     Attends meetings of clubs or organizations: Not on file     Relationship status: Not on file    Intimate partner violence:     Fear of current or ex partner: Not on file     Emotionally abused: Not on file     Physically abused: Not on file     Forced sexual activity: Not on file   Other Topics Concern    Not on file   Social History Narrative    Not on file          ROS  Per HPI    Visit Vitals  /82 (BP 1 Location: Right arm, BP Patient Position: Sitting)   Pulse 74   Temp 98 °F (36.7 °C) (Oral)   Resp 16   Ht 5' 5\" (1.651 m)   Wt 132 lb 6.4 oz (60.1 kg)   SpO2 98%   BMI 22.03 kg/m² Physical Exam   Physical Examination: General appearance - alert, well appearing, and in no distress  Chest - clear to auscultation, no wheezes, rales or rhonchi, symmetric air entry  Heart - normal rate, regular rhythm, normal S1, S2, no murmurs, rubs, clicks or gallops  Neurological - alert, oriented, normal speech, no focal findings or movement disorder noted  Musculoskeletal - abnormal exam of left shoulder with anterior joint line tenderness. There is decreased range of motion laterally. No effusion or erythema. Extremities - peripheral pulses normal, no pedal edema, no clubbing or cyanosis      Assessment/Plan:  Diagnoses and all orders for this visit:    1. Adhesive capsulitis of left shoulder -related to injury. She may have a small rotator cuff tear as a contributor. At this point will treat with stretches, physical therapy, and Tylenol. If not improving, refer for an injection.  -     REFERRAL TO PHYSICAL THERAPY              Advised her to call back or return to office if symptoms worsen/change/persist.  Discussed expected course/resolution/complications of diagnosis in detail with patient. Medication risks/benefits/costs/interactions/alternatives discussed with patient. She was given an after visit summary which includes diagnoses, current medications, & vitals. She expressed understanding with the diagnosis and plan.

## 2020-01-06 NOTE — PROGRESS NOTES
Chief Complaint   Patient presents with    Arm Pain     Reviewed record in preparation for visit and have obtained necessary documentation. Identified pt with two pt identifiers(name and ). Health Maintenance Due   Topic    GLAUCOMA SCREENING Q2Y     Influenza Age 5 to Adult          Chief Complaint   Patient presents with    Arm Pain        Wt Readings from Last 3 Encounters:   20 132 lb 6.4 oz (60.1 kg)   10/09/19 131 lb (59.4 kg)   19 130 lb (59 kg)     Temp Readings from Last 3 Encounters:   20 98 °F (36.7 °C) (Oral)   10/09/19 97.9 °F (36.6 °C) (Oral)   19 97.8 °F (36.6 °C)     BP Readings from Last 3 Encounters:   20 132/82   10/09/19 121/75   19 129/79     Pulse Readings from Last 3 Encounters:   20 74   10/09/19 65   19 71           Learning Assessment:  :     Learning Assessment 2014   PRIMARY LEARNER Patient   HIGHEST LEVEL OF EDUCATION - PRIMARY LEARNER  4 YEARS OF COLLEGE   BARRIERS PRIMARY LEARNER NONE   CO-LEARNER CAREGIVER No   PRIMARY LANGUAGE ENGLISH   LEARNER PREFERENCE PRIMARY DEMONSTRATION   ANSWERED BY patient   RELATIONSHIP SELF       Depression Screening:  :     3 most recent PHQ Screens 2019   Little interest or pleasure in doing things Not at all   Feeling down, depressed, irritable, or hopeless Not at all   Total Score PHQ 2 0       Fall Risk Assessment:  :     Fall Risk Assessment, last 12 mths 2019   Able to walk? Yes   Fall in past 12 months? No       Abuse Screening:  :     Abuse Screening Questionnaire 10/5/2017   Do you ever feel afraid of your partner? N   Are you in a relationship with someone who physically or mentally threatens you? N   Is it safe for you to go home?  Y       Coordination of Care Questionnaire:  :     1) Have you been to an emergency room, urgent care clinic since your last visit? no   Hospitalized since your last visit? no             2) Have you seen or consulted any other health care providers outside of [address] [place] [address] since your last visit? no  (Include any pap smears or colon screenings in this section.)    3) Do you have an Advance Directive on file? yes    4) Are you interested in receiving information on Advance Directives? NO      Patient is accompanied by self I have received verbal consent from Edgardo Altamirano to discuss any/all medical information while they are present in the room. Reviewed record  In preparation for visit and have obtained necessary documentation.

## 2020-03-13 ENCOUNTER — OFFICE VISIT (OUTPATIENT)
Dept: INTERNAL MEDICINE CLINIC | Age: 75
End: 2020-03-13

## 2020-03-13 VITALS
HEART RATE: 73 BPM | WEIGHT: 133 LBS | DIASTOLIC BLOOD PRESSURE: 68 MMHG | TEMPERATURE: 98.2 F | BODY MASS INDEX: 22.16 KG/M2 | RESPIRATION RATE: 12 BRPM | SYSTOLIC BLOOD PRESSURE: 131 MMHG | OXYGEN SATURATION: 98 % | HEIGHT: 65 IN

## 2020-03-13 DIAGNOSIS — G89.29 CHRONIC LEFT SHOULDER PAIN: Primary | ICD-10-CM

## 2020-03-13 DIAGNOSIS — M25.512 CHRONIC LEFT SHOULDER PAIN: Primary | ICD-10-CM

## 2020-03-13 NOTE — PATIENT INSTRUCTIONS

## 2020-03-13 NOTE — PROGRESS NOTES
HPI:  Mahesh Rowe is a 66y.o. year old female who is here for a routine visit:    Presents for persistent left shoulder discomfort. She was seen here in January and has been doing physical therapy since then. Her therapist thinks that it is more of a problem in the upper arm than in the shoulder joint itself. She does continue to have discomfort at night with trying to sleep. No numbness, tingling, or weakness. Her therapist has felt a knot in the biceps area that she has been working on and that has helped. She does not want to take anti-inflammatory drugs. Past Medical History:   Diagnosis Date    Endometriosis     HSV-2 infection     Skin cancer     melanoma x 4       Past Surgical History:   Procedure Laterality Date    HX COLONOSCOPY  09/26/2017    10 yr f/u - Dr. Zia Dunbar HX ORTHOPAEDIC  10/2012    Foot surgery    FL 1 24/7 Card      XR DEXA BONE DENSITY STUDY AXIAL  04.2009       Prior to Admission medications    Medication Sig Start Date End Date Taking? Authorizing Provider   valACYclovir (VALTREX) 1 gram tablet Take 1 Tab by mouth daily. Patient taking differently: Take 500 mg by mouth daily. 10/9/19  Yes Carmen Marlow III, MD   MAGNESIUM CITRATE PO Take 250 mg by mouth. Yes Provider, Historical   TURMERIC, BULK, by Does Not Apply route. Yes Provider, Historical   Cholecalciferol, Vitamin D3, (VITAMIN D3) 1,000 unit cap Take  by mouth. Yes Provider, Historical   fexofenadine (ALLEGRA) 180 mg tablet Take 1 Tab by mouth daily. 7/13/11  Yes Carmen Marlow III, MD   metronidazole (METROCREAM) 0.75 % topical cream Apply  to affected area two (2) times a day. Use a thin layer to affected areas after washing    Yes Provider, Historical   gabapentin (NEURONTIN) 100 mg capsule Take 1 Cap by mouth three (3) times daily as needed for Pain.  Max Daily Amount: 300 mg. 7/12/19   Rodrick Lucas MD Social History     Socioeconomic History    Marital status: UNKNOWN     Spouse name: Not on file    Number of children: Not on file    Years of education: Not on file    Highest education level: Not on file   Occupational History    Not on file   Social Needs    Financial resource strain: Not on file    Food insecurity     Worry: Not on file     Inability: Not on file    Transportation needs     Medical: Not on file     Non-medical: Not on file   Tobacco Use    Smoking status: Former Smoker     Packs/day: 0.25     Years: 11.00     Pack years: 2.75     Last attempt to quit: 1973     Years since quittin.0    Smokeless tobacco: Never Used   Substance and Sexual Activity    Alcohol use: No    Drug use: No    Sexual activity: Not on file   Lifestyle    Physical activity     Days per week: Not on file     Minutes per session: Not on file    Stress: Not on file   Relationships    Social connections     Talks on phone: Not on file     Gets together: Not on file     Attends [employer] service: Not on file     Active member of club or organization: Not on file     Attends meetings of clubs or organizations: Not on file     Relationship status: Not on file    Intimate partner violence     Fear of current or ex partner: Not on file     Emotionally abused: Not on file     Physically abused: Not on file     Forced sexual activity: Not on file   Other Topics Concern    Not on file   Social History Narrative    Not on file          ROS  Per HPI    Visit Vitals  /68   Pulse 73   Temp 98.2 °F (36.8 °C) (Oral)   Resp 12   Ht 5' 5\" (1.651 m)   Wt 133 lb (60.3 kg)   SpO2 98%   BMI 22.13 kg/m²         Physical Exam   Physical Examination: General appearance - alert, well appearing, and in no distress  Chest - clear to auscultation, no wheezes, rales or rhonchi, symmetric air entry  Heart - normal rate, regular rhythm, normal S1, S2, no murmurs, rubs, clicks or gallops  Neurological - alert, oriented, normal speech, no focal findings or movement disorder noted  Musculoskeletal - abnormal exam of left shoulder with tenderness across the upper biceps. There is tenderness across the deltoid as well as the anterior joint line of the shoulder. Normal range of motion. No effusion. No laxity. Extremities - peripheral pulses normal, no pedal edema, no clubbing or cyanosis      Assessment/Plan:  Diagnoses and all orders for this visit:    1. Chronic left shoulder pain -likely soft tissue related. May still be related to her rotator cuff. At this point will get MRI of the humerus and make a decision regarding further evaluation pending that result. -     MRI HUMERUS  LT WO CONT; Future              Advised her to call back or return to office if symptoms worsen/change/persist.  Discussed expected course/resolution/complications of diagnosis in detail with patient. Medication risks/benefits/costs/interactions/alternatives discussed with patient. She was given an after visit summary which includes diagnoses, current medications, & vitals. She expressed understanding with the diagnosis and plan.

## 2020-03-18 ENCOUNTER — HOSPITAL ENCOUNTER (OUTPATIENT)
Dept: MRI IMAGING | Age: 75
Discharge: HOME | End: 2020-03-18
Attending: INTERNAL MEDICINE
Payer: MEDICARE

## 2020-03-18 DIAGNOSIS — M25.512 CHRONIC LEFT SHOULDER PAIN: ICD-10-CM

## 2020-03-18 DIAGNOSIS — G89.29 CHRONIC LEFT SHOULDER PAIN: ICD-10-CM

## 2020-03-18 PROCEDURE — 73218 MRI UPPER EXTREMITY W/O DYE: CPT

## 2020-03-20 ENCOUNTER — TELEPHONE (OUTPATIENT)
Dept: INTERNAL MEDICINE CLINIC | Age: 75
End: 2020-03-20

## 2020-03-20 NOTE — TELEPHONE ENCOUNTER
Alicia Samuel, patient asked if you could call her back about the MRI results -- doesn't think you guys are on the same page.

## 2020-03-20 NOTE — TELEPHONE ENCOUNTER
Called patient about her recent MRI - normal except minimal DJD changes. She wanted to know if the MRI included her posterior arm and I advised her it did. Her pain is in that area but slowly getting better. She wishes to do her own PT and will see ortho to consider an injection if persists. She will call for further issues.

## 2020-03-20 NOTE — TELEPHONE ENCOUNTER
----- Message from To Tomlin MD sent at 3/18/2020  3:00 PM EDT -----  Notify only arthritis in the shoulder - see ortho for an injection if persists.

## 2020-03-20 NOTE — TELEPHONE ENCOUNTER
Spoke with pt and advised that MRI showed arthritis in the shoulder. If pain persist to ortho for possible injection. Verbalized understanding.

## 2020-06-07 RX ORDER — VALACYCLOVIR HYDROCHLORIDE 1 G/1
TABLET, FILM COATED ORAL
Qty: 90 TAB | Refills: 0 | Status: SHIPPED | OUTPATIENT
Start: 2020-06-07 | End: 2020-07-12

## 2020-06-16 ENCOUNTER — TELEPHONE (OUTPATIENT)
Dept: INTERNAL MEDICINE CLINIC | Age: 75
End: 2020-06-16

## 2020-06-16 NOTE — TELEPHONE ENCOUNTER
Sprained wrist 10 days ago and was getting better, but the last few days it is swelling up and is more painful.  Scheduled for appt Friday, June 19, 2020 10:30 AM.

## 2020-06-19 ENCOUNTER — OFFICE VISIT (OUTPATIENT)
Dept: INTERNAL MEDICINE CLINIC | Age: 75
End: 2020-06-19

## 2020-06-19 ENCOUNTER — HOSPITAL ENCOUNTER (OUTPATIENT)
Dept: GENERAL RADIOLOGY | Age: 75
Discharge: HOME | End: 2020-06-19
Attending: INTERNAL MEDICINE
Payer: MEDICARE

## 2020-06-19 ENCOUNTER — TELEPHONE (OUTPATIENT)
Dept: INTERNAL MEDICINE CLINIC | Age: 75
End: 2020-06-19

## 2020-06-19 VITALS
HEIGHT: 65 IN | HEART RATE: 66 BPM | SYSTOLIC BLOOD PRESSURE: 156 MMHG | OXYGEN SATURATION: 95 % | DIASTOLIC BLOOD PRESSURE: 71 MMHG | BODY MASS INDEX: 21.99 KG/M2 | WEIGHT: 132 LBS | RESPIRATION RATE: 14 BRPM | TEMPERATURE: 97.1 F

## 2020-06-19 DIAGNOSIS — M25.531 ACUTE PAIN OF RIGHT WRIST: Primary | ICD-10-CM

## 2020-06-19 DIAGNOSIS — M25.531 ACUTE PAIN OF RIGHT WRIST: ICD-10-CM

## 2020-06-19 PROCEDURE — 73110 X-RAY EXAM OF WRIST: CPT

## 2020-06-19 NOTE — PROGRESS NOTES
HPI:  Heriberto Cartagena is a 66y.o. year old female who is here for a pain in her right wrist for the last 3 weeks or so. She apparently fell backwards 3 weeks ago overall log while doing yard work. She has had pain that is been persistent in her right wrist since then. It hurts with movement in certain directions. She has had some swelling as well. No numbness, tingling, or weakness. Past Medical History:   Diagnosis Date    Endometriosis     HSV-2 infection     Skin cancer     melanoma x 4       Past Surgical History:   Procedure Laterality Date    HX COLONOSCOPY  09/26/2017    10 yr f/u - Dr. Tavo Arreola HX ORTHOPAEDIC  10/2012    Foot surgery    [place] [address]      XR DEXA BONE DENSITY STUDY AXIAL  04.2009       Prior to Admission medications    Medication Sig Start Date End Date Taking? Authorizing Provider   valACYclovir (VALTREX) 1 gram tablet TAKE 1 TABLET BY MOUTH ONE TIME DAILY 6/7/20  Yes Janie Domínguez III, MD   MAGNESIUM CITRATE PO Take 250 mg by mouth. Yes Provider, Historical   TURMERIC, BULK, by Does Not Apply route. Yes Provider, Historical   Cholecalciferol, Vitamin D3, (VITAMIN D3) 1,000 unit cap Take  by mouth. Yes Provider, Historical   fexofenadine (ALLEGRA) 180 mg tablet Take 1 Tab by mouth daily. 7/13/11  Yes Janie Domínguez III, MD   metronidazole (METROCREAM) 0.75 % topical cream Apply  to affected area two (2) times a day. Use a thin layer to affected areas after washing    Yes Provider, Historical   gabapentin (NEURONTIN) 100 mg capsule Take 1 Cap by mouth three (3) times daily as needed for Pain.  Max Daily Amount: 300 mg. 7/12/19   Wilner Bland MD       Social History     Socioeconomic History    Marital status:      Spouse name: Not on file    Number of children: Not on file    Years of education: Not on file    Highest education level: Not on file Occupational History    Not on file   Social Needs    Financial resource strain: Not on file    Food insecurity     Worry: Not on file     Inability: Not on file    Transportation needs     Medical: Not on file     Non-medical: Not on file   Tobacco Use    Smoking status: Former Smoker     Packs/day: 0.25     Years: 11.00     Pack years: 2.75     Last attempt to quit: 1973     Years since quittin.3    Smokeless tobacco: Never Used   Substance and Sexual Activity    Alcohol use: No    Drug use: No    Sexual activity: Not on file   Lifestyle    Physical activity     Days per week: Not on file     Minutes per session: Not on file    Stress: Not on file   Relationships    Social connections     Talks on phone: Not on file     Gets together: Not on file     Attends [employer] service: Not on file     Active member of club or organization: Not on file     Attends meetings of clubs or organizations: Not on file     Relationship status: Not on file    Intimate partner violence     Fear of current or ex partner: Not on file     Emotionally abused: Not on file     Physically abused: Not on file     Forced sexual activity: Not on file   Other Topics Concern    Not on file   Social History Narrative    Not on file          ROS  Per HPI    Visit Vitals  /71   Pulse 66   Temp 97.1 °F (36.2 °C) (Temporal)   Resp 14   Ht 5' 5\" (1.651 m)   Wt 132 lb (59.9 kg)   SpO2 95%   BMI 21.97 kg/m²         Physical Exam     Physical Examination: General appearance - alert, well appearing, and in no distress  Chest - clear to auscultation, no wheezes, rales or rhonchi, symmetric air entry  Heart - normal rate, regular rhythm, normal S1, S2, no murmurs, rubs, clicks or gallops  Neurological - alert, oriented, normal speech, no focal findings or movement disorder noted  Musculoskeletal - abnormal exam of right tenderness along the flexor and extensor aspect of the wrist.  Markedly tender over the distal radius.   No swelling or redness. Assessment/Plan:  Diagnoses and all orders for this visit:    1. Acute pain of right wrist -concern for Colles' fracture. Will refer for x-ray. If positive we will have her see hand surgery. Advised her to call back or return to office if symptoms worsen/change/persist.  Discussed expected course/resolution/complications of diagnosis in detail with patient. Medication risks/benefits/costs/interactions/alternatives discussed with patient. She was given an after visit summary which includes diagnoses, current medications, & vitals. She expressed understanding with the diagnosis and plan.

## 2020-06-19 NOTE — PATIENT INSTRUCTIONS
Wrist Sprain: Rehab Exercises Introduction Here are some examples of exercises for you to try. The exercises may be suggested for a condition or for rehabilitation. Start each exercise slowly. Ease off the exercises if you start to have pain. You will be told when to start these exercises and which ones will work best for you. How to do the exercises Resisted wrist extension 1. Sit leaning forward with your legs slightly spread. Then place your forearm on your thigh with your affected hand and wrist in front of your knee. 2. Grasp one end of an exercise band with your palm down. Step on the other end. 
3. Slowly bend your wrist upward for a count of 2. Then lower your wrist slowly to a count of 5. 
4. Repeat 8 to 12 times. Resisted wrist flexion 1. Sit leaning forward with your legs slightly spread. Then place your forearm on your thigh with your affected hand and wrist in front of your knee. 2. Grasp one end of an exercise band with your palm up. Step on the other end. 
3. Slowly bend your wrist upward for a count of 2. Then lower your wrist slowly to a count of 5. 
4. Repeat 8 to 12 times. Resisted radial deviation 1. Sit leaning forward with your legs slightly spread. Then place your forearm on your thigh with your affected hand and wrist in front of your knee. 2. Grasp one end of an exercise band with your hand facing toward your other thigh. Step on the other end. 
3. Slowly bend your wrist upward for a count of 2. Then lower your wrist slowly to a count of 5. 
4. Repeat 8 to 12 times. Resisted ulnar deviation 1. Sit leaning forward with your legs slightly spread. Then place your forearm on your thigh with your affected hand and wrist by the inside of your knee. 2. Grasp one end of an exercise band with your palm down. Step on the other end with the foot opposite the hand holding the band. 3. Slowly bend your wrist outward and toward your knee for a count of 2. Then slowly move your wrist back to the starting position to a count of 5. 
4. Repeat 8 to 12 times. Resisted forearm pronation 1. Sit leaning forward with your legs slightly spread. Then place your forearm on your thigh with your affected hand and wrist in front of your knee. 2. Grasp one end of an exercise band with your palm up. Step on the other end. 3. Keeping your wrist straight, roll your palm inward toward your thigh for a count of 2. Then slowly move your wrist back to the starting position to a count of 5. 
4. Repeat 8 to 12 times. Resisted supination 1. Sit leaning forward with your legs slightly spread. Then place your forearm on your thigh with your affected hand and wrist in front of your knee. 2. Grasp one end of an exercise band with your palm down. Step on the other end. 3. Keeping your wrist straight, roll your palm outward and away from your thigh for a count of 2. Then slowly move your wrist back to the starting position to a count of 5. 
4. Repeat 8 to 12 times. Follow-up care is a key part of your treatment and safety. Be sure to make and go to all appointments, and call your doctor if you are having problems. It's also a good idea to know your test results and keep a list of the medicines you take. Where can you learn more? Go to http://ludwin-leonard.info/ Enter S110 in the search box to learn more about \"Wrist Sprain: Rehab Exercises. \" Current as of: March 2, 2020               Content Version: 12.5 © 2006-2020 [employer]. Care instructions adapted under license by [employer] (which disclaims liability or warranty for this information). If you have questions about a medical condition or this instruction, always ask your healthcare professional. [address] any warranty or liability for your use of this information.

## 2020-06-19 NOTE — TELEPHONE ENCOUNTER
Patient scheduled w/ Dr. Jeremy Andrade for Monday June 22nd @ [facility] location. Address & ph# provided to patient.

## 2020-06-19 NOTE — PROGRESS NOTES
Patient informed of her results will follow up with her Monday to get in with Ortho asap, pt verbalized understanding.

## 2020-07-12 RX ORDER — VALACYCLOVIR HYDROCHLORIDE 1 G/1
TABLET, FILM COATED ORAL
Qty: 90 TAB | Refills: 0 | Status: SHIPPED | OUTPATIENT
Start: 2020-07-12 | End: 2021-10-20 | Stop reason: SDUPTHER

## 2020-10-15 ENCOUNTER — OFFICE VISIT (OUTPATIENT)
Dept: INTERNAL MEDICINE CLINIC | Age: 75
End: 2020-10-15
Payer: MEDICARE

## 2020-10-15 VITALS
HEIGHT: 65 IN | WEIGHT: 130 LBS | SYSTOLIC BLOOD PRESSURE: 124 MMHG | TEMPERATURE: 97.2 F | BODY MASS INDEX: 21.66 KG/M2 | RESPIRATION RATE: 14 BRPM | HEART RATE: 71 BPM | DIASTOLIC BLOOD PRESSURE: 57 MMHG | OXYGEN SATURATION: 99 %

## 2020-10-15 DIAGNOSIS — C43.61 MALIGNANT MELANOMA OF RIGHT UPPER EXTREMITY INCLUDING SHOULDER (HCC): ICD-10-CM

## 2020-10-15 DIAGNOSIS — E78.2 MIXED HYPERLIPIDEMIA: ICD-10-CM

## 2020-10-15 DIAGNOSIS — E55.9 VITAMIN D DEFICIENCY: ICD-10-CM

## 2020-10-15 DIAGNOSIS — Z00.00 MEDICARE ANNUAL WELLNESS VISIT, SUBSEQUENT: Primary | ICD-10-CM

## 2020-10-15 DIAGNOSIS — B00.9 HSV-2 INFECTION: ICD-10-CM

## 2020-10-15 DIAGNOSIS — M18.10 OSTEOARTHRITIS OF THUMB, UNSPECIFIED LATERALITY: ICD-10-CM

## 2020-10-15 PROCEDURE — 3288F FALL RISK ASSESSMENT DOCD: CPT | Performed by: INTERNAL MEDICINE

## 2020-10-15 PROCEDURE — G8510 SCR DEP NEG, NO PLAN REQD: HCPCS | Performed by: INTERNAL MEDICINE

## 2020-10-15 PROCEDURE — 99214 OFFICE O/P EST MOD 30 MIN: CPT | Performed by: INTERNAL MEDICINE

## 2020-10-15 PROCEDURE — G8399 PT W/DXA RESULTS DOCUMENT: HCPCS | Performed by: INTERNAL MEDICINE

## 2020-10-15 PROCEDURE — G0439 PPPS, SUBSEQ VISIT: HCPCS | Performed by: INTERNAL MEDICINE

## 2020-10-15 PROCEDURE — G8427 DOCREV CUR MEDS BY ELIG CLIN: HCPCS | Performed by: INTERNAL MEDICINE

## 2020-10-15 PROCEDURE — G8420 CALC BMI NORM PARAMETERS: HCPCS | Performed by: INTERNAL MEDICINE

## 2020-10-15 PROCEDURE — 1090F PRES/ABSN URINE INCON ASSESS: CPT | Performed by: INTERNAL MEDICINE

## 2020-10-15 PROCEDURE — G8536 NO DOC ELDER MAL SCRN: HCPCS | Performed by: INTERNAL MEDICINE

## 2020-10-15 PROCEDURE — 1100F PTFALLS ASSESS-DOCD GE2>/YR: CPT | Performed by: INTERNAL MEDICINE

## 2020-10-15 NOTE — PATIENT INSTRUCTIONS

## 2020-10-15 NOTE — PROGRESS NOTES
This is the Subsequent Medicare Annual Wellness Exam, performed 12 months or more after the Initial AWV or the last Subsequent AWV    I have reviewed the patient's medical history in detail and updated the computerized patient record. As well as a follow-up of her health issues. She continues to have some swelling in the left arm. Some pain in her left shoulder as well as both thumbs. She is not been exercising as much as she has in the past.  Her weight is stable. She has been stressed about moving into a new house. Review of systems is otherwise negative. History     Patient Active Problem List   Diagnosis Code    Endometriosis 18    HSV-2 infection B00.9    Mixed hyperlipidemia E78.2    Melanoma ([address] C43.9    Advance directive discussed with patient Z70.80    Vitamin D deficiency E55.9     Past Medical History:   Diagnosis Date    Endometriosis     HSV-2 infection     Skin cancer     melanoma x 4      Past Surgical History:   Procedure Laterality Date    HX COLONOSCOPY  09/26/2017    10 yr f/u - Dr. Topher Gardner HX ORTHOPAEDIC  10/2012    Foot surgery    OR MANIPULATCLAIRE SHMAGALY JT W ANESTHESIA      XR DEXA BONE DENSITY STUDY AXIAL  04.2009     Current Outpatient Medications   Medication Sig Dispense Refill    glucosam/chond/hyalu/CF borate (MOVE FREE JOINT HEALTH PO) Take  by mouth.  valACYclovir (VALTREX) 1 gram tablet TAKE 1 TABLET BY MOUTH ONE TIME DAILY 90 Tab 0    MAGNESIUM CITRATE PO Take 250 mg by mouth.  TURMERIC, BULK, by Does Not Apply route.  Cholecalciferol, Vitamin D3, (VITAMIN D3) 1,000 unit cap Take  by mouth.  fexofenadine (ALLEGRA) 180 mg tablet Take 1 Tab by mouth daily. 90 Tab 3    metronidazole (METROCREAM) 0.75 % topical cream Apply  to affected area two (2) times a day.  Use a thin layer to affected areas after washing       gabapentin (NEURONTIN) 100 mg capsule Take 1 Cap by mouth three (3) times daily as needed for Pain.  Max Daily Amount: 300 mg. 60 Cap 1     No Known Allergies    Family History   Problem Relation Age of Onset    Heart Disease Mother     Cancer Father         Prostate and skin    Heart Disease Father         CAD    Stroke Father     Cancer Brother         Melanoma    Cancer Daughter         Melanoma    Cancer Brother         Skin     Social History     Tobacco Use    Smoking status: Former Smoker     Packs/day: 0.25     Years: 11.00     Pack years: 2.75     Last attempt to quit: 1973     Years since quittin.6    Smokeless tobacco: Never Used   Substance Use Topics    Alcohol use: No   ROS - Per HPI  Physical Examination: General appearance - alert, well appearing, and in no distress  Ears - bilateral TM's and external ear canals normal  Nose - normal and patent, no erythema, discharge or polyps  Mouth - mucous membranes moist, pharynx normal without lesions  Neck - supple, no significant adenopathy  Lymphatics - no palpable lymphadenopathy, no hepatosplenomegaly  Chest - clear to auscultation, no wheezes, rales or rhonchi, symmetric air entry  Heart - normal rate and regular rhythm  Abdomen - soft, nontender, nondistended, no masses or organomegaly  Back exam - full range of motion, no tenderness, palpable spasm or pain on motion  Neurological - alert, oriented, normal speech, no focal findings or movement disorder noted  Musculoskeletal - no joint tenderness, deformity or swelling, osteoarthritic changes noted in both hands  Extremities - peripheral pulses normal, no pedal edema, no clubbing or cyanosis      Depression Risk Factor Screening:     3 most recent PHQ Screens 10/15/2020   Little interest or pleasure in doing things Not at all   Feeling down, depressed, irritable, or hopeless Not at all   Total Score PHQ 2 0       Alcohol Risk Screen   Do you average more than 1 drink per night or more than 7 drinks a week:  No    On any one occasion in the past three months have you have had more than 3 drinks containing alcohol:  No        Functional Ability and Level of Safety:   Hearing: Hearing is good. Activities of Daily Living: The home contains: no safety equipment. Patient does total self care     Ambulation: with no difficulty     Fall Risk:  Fall Risk Assessment, last 12 mths 10/15/2020   Able to walk? Yes   Fall in past 12 months? Yes   Fall with injury? Yes   Number of falls in past 12 months 1   Fall Risk Score 2     Abuse Screen:  Patient is not abused       Cognitive Screening   Has your family/caregiver stated any concerns about your memory: no         Patient Care Team   Patient Care Team:  Ramon Yanez MD as PCP - General  Ramon Yanez MD as PCP - [facility] Empaneled Provider  Brien Everett MD (Obstetrics & Gynecology)  Corazon Martinez MD (Ophthalmology)    Assessment/Plan   Education and counseling provided:  Are appropriate based on today's review and evaluation  End-of-Life planning (with patient's consent)  Influenza Vaccine  Screening Mammography  Diabetes screening test    Diagnoses and all orders for this visit:    1. Mixed hyperlipidemiadiet controlled. Check labs to be sure that is adequate. 2. Malignant melanoma of right upper extremity including shoulder (HCC)seeing dermatology every 6 months for follow-up. Her lymphedema is stable. 3. HSV-2 infectionstable. 4. Vitamin D deficiencyrepeat vitamin D level to be sure it is adequately replaced.     5. Medicare annual wellness visit, subsequent        Health Maintenance Due   Topic Date Due    Flu Vaccine (1) 09/01/2020    Medicare Yearly Exam  10/09/2020

## 2020-12-30 LAB
25(OH)D3+25(OH)D2 SERPL-MCNC: 33.4 NG/ML (ref 30–100)
ALBUMIN SERPL-MCNC: 4.2 G/DL (ref 3.7–4.7)
ALBUMIN/GLOB SERPL: 1.8 {RATIO} (ref 1.2–2.2)
ALP SERPL-CCNC: 94 IU/L (ref 39–117)
ALT SERPL-CCNC: 25 IU/L (ref 0–32)
AST SERPL-CCNC: 29 IU/L (ref 0–40)
BASOPHILS # BLD AUTO: 0.1 X10E3/UL (ref 0–0.2)
BASOPHILS NFR BLD AUTO: 1 %
BILIRUB SERPL-MCNC: 0.4 MG/DL (ref 0–1.2)
BUN SERPL-MCNC: 13 MG/DL (ref 8–27)
BUN/CREAT SERPL: 17 (ref 12–28)
CALCIUM SERPL-MCNC: 9.4 MG/DL (ref 8.7–10.3)
CHLORIDE SERPL-SCNC: 102 MMOL/L (ref 96–106)
CHOLEST SERPL-MCNC: 223 MG/DL (ref 100–199)
CO2 SERPL-SCNC: 27 MMOL/L (ref 20–29)
CREAT SERPL-MCNC: 0.78 MG/DL (ref 0.57–1)
EOSINOPHIL # BLD AUTO: 0.1 X10E3/UL (ref 0–0.4)
EOSINOPHIL NFR BLD AUTO: 2 %
ERYTHROCYTE [DISTWIDTH] IN BLOOD BY AUTOMATED COUNT: 11.8 % (ref 11.7–15.4)
GLOBULIN SER CALC-MCNC: 2.4 G/DL (ref 1.5–4.5)
GLUCOSE SERPL-MCNC: 86 MG/DL (ref 65–99)
HCT VFR BLD AUTO: 40.1 % (ref 34–46.6)
HDLC SERPL-MCNC: 68 MG/DL
HGB BLD-MCNC: 13.9 G/DL (ref 11.1–15.9)
IMM GRANULOCYTES # BLD AUTO: 0 X10E3/UL (ref 0–0.1)
IMM GRANULOCYTES NFR BLD AUTO: 0 %
LDLC SERPL CALC-MCNC: 141 MG/DL (ref 0–99)
LYMPHOCYTES # BLD AUTO: 3.2 X10E3/UL (ref 0.7–3.1)
LYMPHOCYTES NFR BLD AUTO: 53 %
MCH RBC QN AUTO: 32.4 PG (ref 26.6–33)
MCHC RBC AUTO-ENTMCNC: 34.7 G/DL (ref 31.5–35.7)
MCV RBC AUTO: 94 FL (ref 79–97)
MONOCYTES # BLD AUTO: 0.4 X10E3/UL (ref 0.1–0.9)
MONOCYTES NFR BLD AUTO: 7 %
NEUTROPHILS # BLD AUTO: 2.2 X10E3/UL (ref 1.4–7)
NEUTROPHILS NFR BLD AUTO: 37 %
PLATELET # BLD AUTO: 240 X10E3/UL (ref 150–450)
POTASSIUM SERPL-SCNC: 4 MMOL/L (ref 3.5–5.2)
PROT SERPL-MCNC: 6.6 G/DL (ref 6–8.5)
RBC # BLD AUTO: 4.29 X10E6/UL (ref 3.77–5.28)
SODIUM SERPL-SCNC: 140 MMOL/L (ref 134–144)
TRIGL SERPL-MCNC: 78 MG/DL (ref 0–149)
TSH SERPL DL<=0.005 MIU/L-ACNC: 1.44 UIU/ML (ref 0.45–4.5)
VLDLC SERPL CALC-MCNC: 14 MG/DL (ref 5–40)
WBC # BLD AUTO: 6 X10E3/UL (ref 3.4–10.8)

## 2021-02-11 ENCOUNTER — OFFICE VISIT (OUTPATIENT)
Dept: INTERNAL MEDICINE CLINIC | Age: 76
End: 2021-02-11
Payer: MEDICARE

## 2021-02-11 VITALS
HEART RATE: 70 BPM | BODY MASS INDEX: 21.99 KG/M2 | OXYGEN SATURATION: 97 % | RESPIRATION RATE: 16 BRPM | DIASTOLIC BLOOD PRESSURE: 75 MMHG | HEIGHT: 65 IN | TEMPERATURE: 95.8 F | WEIGHT: 132 LBS | SYSTOLIC BLOOD PRESSURE: 126 MMHG

## 2021-02-11 DIAGNOSIS — E78.2 MIXED HYPERLIPIDEMIA: ICD-10-CM

## 2021-02-11 DIAGNOSIS — H25.9 AGE-RELATED CATARACT OF BOTH EYES, UNSPECIFIED AGE-RELATED CATARACT TYPE: Primary | ICD-10-CM

## 2021-02-11 PROCEDURE — 1100F PTFALLS ASSESS-DOCD GE2>/YR: CPT | Performed by: INTERNAL MEDICINE

## 2021-02-11 PROCEDURE — G8427 DOCREV CUR MEDS BY ELIG CLIN: HCPCS | Performed by: INTERNAL MEDICINE

## 2021-02-11 PROCEDURE — G8510 SCR DEP NEG, NO PLAN REQD: HCPCS | Performed by: INTERNAL MEDICINE

## 2021-02-11 PROCEDURE — G8420 CALC BMI NORM PARAMETERS: HCPCS | Performed by: INTERNAL MEDICINE

## 2021-02-11 PROCEDURE — 1090F PRES/ABSN URINE INCON ASSESS: CPT | Performed by: INTERNAL MEDICINE

## 2021-02-11 PROCEDURE — G8399 PT W/DXA RESULTS DOCUMENT: HCPCS | Performed by: INTERNAL MEDICINE

## 2021-02-11 PROCEDURE — G8536 NO DOC ELDER MAL SCRN: HCPCS | Performed by: INTERNAL MEDICINE

## 2021-02-11 PROCEDURE — 99214 OFFICE O/P EST MOD 30 MIN: CPT | Performed by: INTERNAL MEDICINE

## 2021-02-11 PROCEDURE — 3288F FALL RISK ASSESSMENT DOCD: CPT | Performed by: INTERNAL MEDICINE

## 2021-02-11 NOTE — PROGRESS NOTES
Preoperative Evaluation    Date of Exam: 2021    Claudetta Kindred is a 66 y.o. female (:1945) who presents for preoperative evaluation. Procedure/Surgery:Bilateral Cataract  Date of Procedure/Surgery: 21 and 3/17/21  Surgeon: Bibi Cai MD  Hospital/Surgical Facility: [address] [place] [address]  Primary Physician: Karl Newman MD  Latex Allergy: no    Problem List:     Patient Active Problem List    Diagnosis Date Noted    Vitamin D deficiency 10/03/2016    Advance directive discussed with patient 2015    Melanoma ([address] 2013    Mixed hyperlipidemia 07/10/2012    Endometriosis     HSV-2 infection      Medical History:     Past Medical History:   Diagnosis Date    Endometriosis     HSV-2 infection     Skin cancer     melanoma x 4     Allergies:   No Known Allergies   Medications:     Current Outpatient Medications   Medication Sig    glucosam/chond/hyalu/CF borate (MOVE FREE JOINT HEALTH PO) Take  by mouth.  valACYclovir (VALTREX) 1 gram tablet TAKE 1 TABLET BY MOUTH ONE TIME DAILY    MAGNESIUM CITRATE PO Take 250 mg by mouth.  TURMERIC, BULK, by Does Not Apply route.  Cholecalciferol, Vitamin D3, (VITAMIN D3) 1,000 unit cap Take  by mouth.  fexofenadine (ALLEGRA) 180 mg tablet Take 1 Tab by mouth daily.  metronidazole (METROCREAM) 0.75 % topical cream Apply  to affected area two (2) times a day. Use a thin layer to affected areas after washing      No current facility-administered medications for this visit.       Surgical History:     Past Surgical History:   Procedure Laterality Date    HX COLONOSCOPY  2017    10 yr f/u - Dr. Meng De Oliveira HX MOHS PROCEDURES  10/2020    HX Raegan Lyle HX ORTHOPAEDIC  10/2012    Foot surgery    DC BARON SHLDIZABEL JT W ANESTHESIA      XR DEXA BONE DENSITY STUDY AXIAL  04.     Social History:     Social History     Socioeconomic History    Marital status:      Spouse name: Not on file    Number of children: Not on file    Years of education: Not on file    Highest education level: Not on file   Tobacco Use    Smoking status: Former Smoker     Packs/day: 0.25     Years: 11.00     Pack years: 2.75     Quit date: 1973     Years since quittin.9    Smokeless tobacco: Never Used   Substance and Sexual Activity    Alcohol use: No    Drug use: No       Recent use of: No recent use of aspirin (ASA), NSAIDS or steroids    Tetanus up to date: last tetanus booster within 10 years      Anesthesia Complications: None  History of abnormal bleeding : None  History of Blood Transfusions: no  Health Care Directive or Living Will: no    REVIEW OF SYSTEMS:  A comprehensive review of systems was negative except for: Eyes: positive for visual disturbance    EXAM:   Visit Vitals  /75   Pulse 70   Temp (!) 95.8 °F (35.4 °C) (Temporal)   Resp 16   Ht 5' 5\" (1.651 m)   Wt 132 lb (59.9 kg)   SpO2 97%   BMI 21.97 kg/m²     General appearance - alert, well appearing, and in no distress  Ears - bilateral TM's and external ear canals normal  Mouth - mucous membranes moist, pharynx normal without lesions  Neck - supple, no significant adenopathy  Lymphatics - no palpable lymphadenopathy, no hepatosplenomegaly  Chest - clear to auscultation, no wheezes, rales or rhonchi, symmetric air entry  Heart - normal rate, regular rhythm, normal S1, S2, no murmurs, rubs, clicks or gallops  Abdomen - soft, nontender, nondistended, no masses or organomegaly  Neurological - alert, oriented, normal speech, no focal findings or movement disorder noted  Musculoskeletal - no joint tenderness, deformity or swelling  Extremities - peripheral pulses normal, no pedal edema, no clubbing or cyanosis          IMPRESSION:   None  No contraindications to planned surgery    Cleatis Hashimoto, MD   2021

## 2021-02-22 ENCOUNTER — TELEPHONE (OUTPATIENT)
Dept: INTERNAL MEDICINE CLINIC | Age: 76
End: 2021-02-22

## 2021-10-20 ENCOUNTER — OFFICE VISIT (OUTPATIENT)
Dept: INTERNAL MEDICINE CLINIC | Age: 76
End: 2021-10-20
Payer: MEDICARE

## 2021-10-20 VITALS
HEIGHT: 65 IN | DIASTOLIC BLOOD PRESSURE: 73 MMHG | HEART RATE: 71 BPM | OXYGEN SATURATION: 97 % | SYSTOLIC BLOOD PRESSURE: 125 MMHG | BODY MASS INDEX: 21.13 KG/M2 | WEIGHT: 126.8 LBS | RESPIRATION RATE: 16 BRPM | TEMPERATURE: 97.8 F

## 2021-10-20 DIAGNOSIS — Z00.00 MEDICARE ANNUAL WELLNESS VISIT, SUBSEQUENT: Primary | ICD-10-CM

## 2021-10-20 DIAGNOSIS — M77.42 METATARSALGIA OF BOTH FEET: ICD-10-CM

## 2021-10-20 DIAGNOSIS — M77.41 METATARSALGIA OF BOTH FEET: ICD-10-CM

## 2021-10-20 DIAGNOSIS — C43.61 MALIGNANT MELANOMA OF RIGHT UPPER EXTREMITY INCLUDING SHOULDER (HCC): ICD-10-CM

## 2021-10-20 DIAGNOSIS — E78.2 MIXED HYPERLIPIDEMIA: ICD-10-CM

## 2021-10-20 DIAGNOSIS — Z11.59 NEED FOR HEPATITIS C SCREENING TEST: ICD-10-CM

## 2021-10-20 DIAGNOSIS — E55.9 VITAMIN D DEFICIENCY: ICD-10-CM

## 2021-10-20 PROCEDURE — 1101F PT FALLS ASSESS-DOCD LE1/YR: CPT | Performed by: INTERNAL MEDICINE

## 2021-10-20 PROCEDURE — 1090F PRES/ABSN URINE INCON ASSESS: CPT | Performed by: INTERNAL MEDICINE

## 2021-10-20 PROCEDURE — G8536 NO DOC ELDER MAL SCRN: HCPCS | Performed by: INTERNAL MEDICINE

## 2021-10-20 PROCEDURE — G8427 DOCREV CUR MEDS BY ELIG CLIN: HCPCS | Performed by: INTERNAL MEDICINE

## 2021-10-20 PROCEDURE — 99213 OFFICE O/P EST LOW 20 MIN: CPT | Performed by: INTERNAL MEDICINE

## 2021-10-20 PROCEDURE — G8510 SCR DEP NEG, NO PLAN REQD: HCPCS | Performed by: INTERNAL MEDICINE

## 2021-10-20 PROCEDURE — G8399 PT W/DXA RESULTS DOCUMENT: HCPCS | Performed by: INTERNAL MEDICINE

## 2021-10-20 PROCEDURE — G8420 CALC BMI NORM PARAMETERS: HCPCS | Performed by: INTERNAL MEDICINE

## 2021-10-20 PROCEDURE — G0439 PPPS, SUBSEQ VISIT: HCPCS | Performed by: INTERNAL MEDICINE

## 2021-10-20 RX ORDER — VALACYCLOVIR HYDROCHLORIDE 1 G/1
TABLET, FILM COATED ORAL
Qty: 90 TABLET | Refills: 3 | Status: SHIPPED | OUTPATIENT
Start: 2021-10-20 | End: 2022-09-12 | Stop reason: SDUPTHER

## 2021-10-20 NOTE — PROGRESS NOTES
Chief Complaint   Patient presents with    Annual Wellness Visit    Foot Pain     bilateral foot pain        1. Have you been to the ER, urgent care clinic since your last visit? Hospitalized since your last visit? No    2. Have you seen or consulted any other health care providers outside of the [address] [place] [address] since your last visit? Include any pap smears or colon screening.  No

## 2021-10-21 NOTE — PATIENT INSTRUCTIONS

## 2021-10-21 NOTE — PROGRESS NOTES
This is the Subsequent Medicare Annual Wellness Exam, performed 12 months or more after the Initial AWV or the last Subsequent AWV    I have reviewed the patient's medical history in detail and updated the computerized patient record. Also for a follow-up of her health issues. She is recently had pain over the plantar aspect of the right foot along the metatarsals. She thought it was plantar fasciitis and is been trying to do stretching. Its been painful just to use a tennis ball over the plantar aspect of the foot. Her only trauma has been the use of a different pair shoes recently. She does have some ongoing issues with back pain. She is up-to-date on seeing the dermatologist.  She has also had her mammogram and bone density done as well. ROS - Per HPI    Physical Examination: General appearance - alert, well appearing, and in no distress  Ears - bilateral TM's and external ear canals normal  Nose - normal and patent, no erythema, discharge or polyps  Mouth - mucous membranes moist, pharynx normal without lesions  Neck - supple, no significant adenopathy  Lymphatics - no palpable lymphadenopathy, no hepatosplenomegaly  Chest - clear to auscultation, no wheezes, rales or rhonchi, symmetric air entry  Heart - normal rate and regular rhythm  Abdomen - soft, nontender, nondistended, no masses or organomegaly  Back exam - full range of motion, no tenderness, palpable spasm or pain on motion, scoliosis noted   Neurological - alert, oriented, normal speech, no focal findings or movement disorder noted, motor and sensory grossly normal bilaterally  Musculoskeletal - abnormal exam of right foot with tenderness along the plantar aspect at the metatarsal head. No discrete mass or nodules are present. No tenderness across the posterior plantar fascia. No redness or warmth.   Extremities - peripheral pulses normal, no pedal edema, no clubbing or cyanosis        Assessment/Plan   Education and counseling provided:  Are appropriate based on today's review and evaluation  Diabetes screening test    1. Mixed hyperlipidemia-diet controlled. Will check labs to be sure that is adequate. -     METABOLIC PANEL, COMPREHENSIVE; Future  -     CBC WITH AUTOMATED DIFF; Future  -     TSH 3RD GENERATION; Future  -     LIPID PANEL; Future  2. Malignant melanoma of right upper extremity including shoulder (HCC)-in remission. Continue every 6-month visits to the dermatologist.  3. Vitamin D deficiency-repeat vitamin D level. -     VITAMIN D, 25 HYDROXY; Future  4. Need for hepatitis C screening test  -     HEPATITIS C AB; Future  5. Medicare annual wellness visit, subsequent  6. 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 If not improved, will let me know or see podiatry. Depression Risk Factor Screening     3 most recent PHQ Screens 10/20/2021   Little interest or pleasure in doing things Not at all   Feeling down, depressed, irritable, or hopeless Not at all   Total Score PHQ 2 0       Alcohol Risk Screen    Do you average more than 1 drink per night or more than 7 drinks a week:  No    On any one occasion in the past three months have you have had more than 3 drinks containing alcohol:  No        Functional Ability and Level of Safety    Hearing: Hearing is good. Activities of Daily Living: The home contains: no safety equipment. Patient does total self care      Ambulation: with no difficulty     Fall Risk:  Fall Risk Assessment, last 12 mths 10/20/2021   Able to walk? Yes   Fall in past 12 months? 0   Do you feel unsteady? 0   Are you worried about falling 0   Number of falls in past 12 months -   Fall with injury?  -      Abuse Screen:  Patient is not abused       Cognitive Screening    Has your family/caregiver stated any concerns about your memory: no         Health Maintenance Due     Health Maintenance Due   Topic Date Due    Hepatitis C Screening  Never done       Patient Care Team   Patient Care Team:  Flakita Fletcher MD as PCP - General  Flakita Fletcher MD as PCP - 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 Yaron Dominguez HX HYSTERECTOMY      HX MOHS PROCEDURES  10/2020    HX Willa Beams HX ORTHOPAEDIC  10/2012    Foot surgery    GA BETTYULATCLAIRE SHLDR JT W ANESTHESIA      XR DEXA BONE DENSITY STUDY AXIAL       Current Outpatient Medications   Medication Sig Dispense Refill    valACYclovir (VALTREX) 1 gram tablet TAKE 1 TABLET BY MOUTH ONE TIME DAILY 90 Tablet 3    TURMERIC, BULK, by Does Not Apply route.  Cholecalciferol, Vitamin D3, (VITAMIN D3) 1,000 unit cap Take  by mouth.  fexofenadine (ALLEGRA) 180 mg tablet Take 1 Tab by mouth daily. 90 Tab 3    metronidazole (METROCREAM) 0.75 % topical cream Apply  to affected area two (2) times a day.  Use a thin layer to affected areas after washing        No Known Allergies    Family History   Problem Relation Age of Onset    Heart Disease Mother     Cancer Father         Prostate and skin    Heart Disease Father         CAD    Stroke Father     Cancer Brother         Melanoma    Cancer Daughter         Melanoma    Cancer Brother         Skin     Social History     Tobacco Use    Smoking status: Former Smoker     Packs/day: 0.25     Years: 11.00     Pack years: 2.75     Quit date: 1973     Years since quittin.6    Smokeless tobacco: Never Used   Substance Use Topics    Alcohol use: No         Nikki Silver MD

## 2022-01-12 ENCOUNTER — APPOINTMENT (OUTPATIENT)
Dept: INTERNAL MEDICINE CLINIC | Age: 77
End: 2022-01-12

## 2022-01-12 DIAGNOSIS — E55.9 VITAMIN D DEFICIENCY: ICD-10-CM

## 2022-01-12 DIAGNOSIS — Z11.59 NEED FOR HEPATITIS C SCREENING TEST: ICD-10-CM

## 2022-01-12 DIAGNOSIS — E78.2 MIXED HYPERLIPIDEMIA: ICD-10-CM

## 2022-01-14 LAB
25(OH)D3 SERPL-MCNC: 32.7 NG/ML (ref 30–100)
ALBUMIN SERPL-MCNC: 4 G/DL (ref 3.5–5)
ALBUMIN/GLOB SERPL: 1.3 {RATIO} (ref 1.1–2.2)
ALP SERPL-CCNC: 101 U/L (ref 45–117)
ALT SERPL-CCNC: 25 U/L (ref 12–78)
ANION GAP SERPL CALC-SCNC: 3 MMOL/L (ref 5–15)
AST SERPL-CCNC: 20 U/L (ref 15–37)
BASOPHILS # BLD: 0.1 K/UL (ref 0–0.1)
BASOPHILS NFR BLD: 1 % (ref 0–1)
BILIRUB SERPL-MCNC: 0.5 MG/DL (ref 0.2–1)
BUN SERPL-MCNC: 14 MG/DL (ref 6–20)
BUN/CREAT SERPL: 18 (ref 12–20)
CALCIUM SERPL-MCNC: 9.6 MG/DL (ref 8.5–10.1)
CHLORIDE SERPL-SCNC: 104 MMOL/L (ref 97–108)
CHOLEST SERPL-MCNC: 233 MG/DL
CO2 SERPL-SCNC: 31 MMOL/L (ref 21–32)
CREAT SERPL-MCNC: 0.79 MG/DL (ref 0.55–1.02)
DIFFERENTIAL METHOD BLD: ABNORMAL
EOSINOPHIL # BLD: 0.1 K/UL (ref 0–0.4)
EOSINOPHIL NFR BLD: 1 % (ref 0–7)
ERYTHROCYTE [DISTWIDTH] IN BLOOD BY AUTOMATED COUNT: 12 % (ref 11.5–14.5)
GLOBULIN SER CALC-MCNC: 3.2 G/DL (ref 2–4)
GLUCOSE SERPL-MCNC: 81 MG/DL (ref 65–100)
HCT VFR BLD AUTO: 41.8 % (ref 35–47)
HCV AB SERPL QL IA: NONREACTIVE
HDLC SERPL-MCNC: 76 MG/DL
HDLC SERPL: 3.1 {RATIO} (ref 0–5)
HGB BLD-MCNC: 13.3 G/DL (ref 11.5–16)
IMM GRANULOCYTES # BLD AUTO: 0 K/UL (ref 0–0.04)
IMM GRANULOCYTES NFR BLD AUTO: 0 % (ref 0–0.5)
LDLC SERPL CALC-MCNC: 141.6 MG/DL (ref 0–100)
LYMPHOCYTES # BLD: 3.1 K/UL (ref 0.8–3.5)
LYMPHOCYTES NFR BLD: 48 % (ref 12–49)
MCH RBC QN AUTO: 31.7 PG (ref 26–34)
MCHC RBC AUTO-ENTMCNC: 31.8 G/DL (ref 30–36.5)
MCV RBC AUTO: 99.8 FL (ref 80–99)
MONOCYTES # BLD: 0.5 K/UL (ref 0–1)
MONOCYTES NFR BLD: 7 % (ref 5–13)
NEUTS SEG # BLD: 2.9 K/UL (ref 1.8–8)
NEUTS SEG NFR BLD: 43 % (ref 32–75)
NRBC # BLD: 0 K/UL (ref 0–0.01)
NRBC BLD-RTO: 0 PER 100 WBC
PLATELET # BLD AUTO: 238 K/UL (ref 150–400)
PMV BLD AUTO: 9.8 FL (ref 8.9–12.9)
POTASSIUM SERPL-SCNC: 4.6 MMOL/L (ref 3.5–5.1)
PROT SERPL-MCNC: 7.2 G/DL (ref 6.4–8.2)
RBC # BLD AUTO: 4.19 M/UL (ref 3.8–5.2)
SODIUM SERPL-SCNC: 138 MMOL/L (ref 136–145)
TRIGL SERPL-MCNC: 77 MG/DL (ref ?–150)
TSH SERPL DL<=0.05 MIU/L-ACNC: 1.12 UIU/ML (ref 0.36–3.74)
VLDLC SERPL CALC-MCNC: 15.4 MG/DL
WBC # BLD AUTO: 6.6 K/UL (ref 3.6–11)

## 2022-01-28 ENCOUNTER — OFFICE VISIT (OUTPATIENT)
Dept: INTERNAL MEDICINE CLINIC | Age: 77
End: 2022-01-28
Payer: MEDICARE

## 2022-01-28 VITALS
TEMPERATURE: 97.6 F | RESPIRATION RATE: 16 BRPM | OXYGEN SATURATION: 97 % | HEIGHT: 65 IN | WEIGHT: 128 LBS | HEART RATE: 77 BPM | SYSTOLIC BLOOD PRESSURE: 144 MMHG | BODY MASS INDEX: 21.33 KG/M2 | DIASTOLIC BLOOD PRESSURE: 79 MMHG

## 2022-01-28 DIAGNOSIS — M70.62 TROCHANTERIC BURSITIS OF LEFT HIP: Primary | ICD-10-CM

## 2022-01-28 DIAGNOSIS — C43.61 MALIGNANT MELANOMA OF RIGHT UPPER EXTREMITY INCLUDING SHOULDER (HCC): ICD-10-CM

## 2022-01-28 DIAGNOSIS — M16.12 ARTHRITIS OF LEFT HIP: ICD-10-CM

## 2022-01-28 PROCEDURE — G8420 CALC BMI NORM PARAMETERS: HCPCS | Performed by: INTERNAL MEDICINE

## 2022-01-28 PROCEDURE — 1101F PT FALLS ASSESS-DOCD LE1/YR: CPT | Performed by: INTERNAL MEDICINE

## 2022-01-28 PROCEDURE — G8510 SCR DEP NEG, NO PLAN REQD: HCPCS | Performed by: INTERNAL MEDICINE

## 2022-01-28 PROCEDURE — 1090F PRES/ABSN URINE INCON ASSESS: CPT | Performed by: INTERNAL MEDICINE

## 2022-01-28 PROCEDURE — 20610 DRAIN/INJ JOINT/BURSA W/O US: CPT | Performed by: INTERNAL MEDICINE

## 2022-01-28 PROCEDURE — G8399 PT W/DXA RESULTS DOCUMENT: HCPCS | Performed by: INTERNAL MEDICINE

## 2022-01-28 PROCEDURE — G8427 DOCREV CUR MEDS BY ELIG CLIN: HCPCS | Performed by: INTERNAL MEDICINE

## 2022-01-28 PROCEDURE — 99213 OFFICE O/P EST LOW 20 MIN: CPT | Performed by: INTERNAL MEDICINE

## 2022-01-28 PROCEDURE — G8536 NO DOC ELDER MAL SCRN: HCPCS | Performed by: INTERNAL MEDICINE

## 2022-01-28 RX ORDER — LIDOCAINE HYDROCHLORIDE 10 MG/ML
1 INJECTION INFILTRATION; PERINEURAL ONCE
Status: DISCONTINUED | OUTPATIENT
Start: 2022-01-28 | End: 2022-01-28

## 2022-01-28 RX ORDER — METHYLPREDNISOLONE ACETATE 80 MG/ML
40 INJECTION, SUSPENSION INTRA-ARTICULAR; INTRALESIONAL; INTRAMUSCULAR; SOFT TISSUE ONCE
Status: COMPLETED | OUTPATIENT
Start: 2022-01-28 | End: 2022-01-28

## 2022-01-28 RX ORDER — METHYLPREDNISOLONE ACETATE 40 MG/ML
40 INJECTION, SUSPENSION INTRA-ARTICULAR; INTRALESIONAL; INTRAMUSCULAR; SOFT TISSUE ONCE
Qty: 1 ML | Refills: 0
Start: 2022-01-28 | End: 2022-01-28 | Stop reason: SDUPTHER

## 2022-01-28 RX ADMIN — METHYLPREDNISOLONE ACETATE 40 MG: 80 INJECTION, SUSPENSION INTRA-ARTICULAR; INTRALESIONAL; INTRAMUSCULAR; SOFT TISSUE at 09:30

## 2022-01-28 NOTE — LETTER
Babatunde Reed  : 1945, hereby authorize Dr. Ryan Andrade MD to perform the following medical treatment and/or procedure(s):  Left hip injection. I understand my provider may be assisted with significant procedural tasks by other qualified medical practitioners, who may perform important parts of the treatment/procedure(s). These practitioners will only perform tasks within the scope of their licensure and practice, as determined under [place] law and any other applicable regulation(s). Name and Credentials of Practitioners who may assist with my treatment/procedure(s):    Ryan Andrade MD, Parul Celis LPN    I understand that certain complications may arise during the use of analgesia during my treatment/procedure(s) that may include but are not limited to, decreased/altered awareness, respiratory problems, drug reactions, paralysis, brain damage, or possibly, death. I understand that the analgesia required for the performance of my treatment/procedure(s) involves additional risks beyond those of the treatment/procedure(s) to be performed and authorize the use of analgesia by my provider as deemed necessary for the control of pain during my treatment/procedure(s). I understand that my provider may need to change the type of analgesia or medications used, possibly without explanation to me, and I consent to any such changes as deemed necessary by my provider for treatment of my medical condition(s). I understand that there are risks of infection and other unexpected complications associated with any medical or surgical treatment/procedure including the treatment/procedure(s) listed above or other treatment/procedure(s) necessitated by my medical condition, and that such complications may occur in the absence of any negligence on the part of my healthcare providers.     My provider has explained to my satisfaction my medical condition and the specific treatment/procedure(s) recommended and identified above. I have been given an opportunity to ask and have answered to my satisfaction questions about:                 · The nature and extent of the treatment/procedure(s) to be performed;            · The benefits of treatment, and the risks associated with not having the recommended treatment/procedure(s); · The risks and possible complications associated with having the treatments, including those which, even though unlikely to occur, may involve serious consequences;    · Analgesia and alternative forms of analgesia;          · Alternative procedures and methods of treatment and the risk associated with each;             · The expected consequences of the treatment/procedure(s) on my future health. I understand that no assurance can be given that the treatment/procedure(s) performed will be a success, and no guarantee or warranty of success for the treatment/procedure(s) has been given to me by my provider. I consent to the disposal by the examining Pathologist of any tissue removed during my treatment/procedure(s) in accordance with the receiving hospital's or laboratory's policy and any associated regulations specific to such disposal.                         I DO _____  DO NOT _____ consent to other health care personnel observing my treatment/procedure(s) for the purpose of medical education or other specified purposes as may be explained by my provider. I DO______ DO NOT _____ consent to photography or videotaping of all or any part of my treatment/procedure(s) for medical and/or educational purposes. I understand that my identity will not be revealed in any photographs, videos, or accompanying explanations should these images be used by my healthcare providers.     I certify that I have read and fully understand the above Consent for Treatment/Procedure and that all blanks were completed before I signed this consent. Print Name of Patient or Legal Representative _____________________________     Relationship to Patient ________________________________________________    Signature of Patient of Legal Representative________________________________    Witness to Signature _______________________________  Date/Time _______AM/PM    (If patient is a minor or is unable to sign, complete the following)    Patent is a minor, _______ years of age, or is unable to sign due to:  _______________    I have explained the nature, purpose and anticipated benefits as well as any possible alternative methods of treatment, the known risks that are involved, and the possibility of complications of the proposed procedure(s) to the patient or patient's legal representative. I have provided the patient or legal representative with an opportunity to ask and have answered to their satisfaction any questions about the proposed treatment/procedure(s) and alternative methods of treatment.       Provider Signature:________________________Date:___________Time:_______AM/PM                                                                                                                                                               version 3/2020

## 2022-01-28 NOTE — PATIENT INSTRUCTIONS
Hip Bursitis: Exercises  Introduction  Here are some examples of exercises for you to try. The exercises may be suggested for a condition or for rehabilitation. Start each exercise slowly. Ease off the exercises if you start to have pain. You will be told when to start these exercises and which ones will work best for you. How to do the exercises  Hip rotator stretch    1. Lie on your back with both knees bent and your feet flat on the floor. 2. Put the ankle of your affected leg on your opposite thigh near your knee. 3. Use your hand to gently push your knee away from your body until you feel a gentle stretch around your hip. 4. Hold the stretch for 15 to 30 seconds. 5. Repeat 2 to 4 times. 6. Repeat steps 1 through 5, but this time use your hand to gently pull your knee toward your opposite shoulder. Iliotibial band stretch    1. Lean sideways against a wall. If you are not steady on your feet, hold on to a chair or counter. 2. Stand on the leg with the affected hip, with that leg close to the wall. Then cross your other leg in front of it. 3. Let your affected hip drop out to the side of your body and against wall. Then lean away from your affected hip until you feel a stretch. 4. Hold the stretch for 15 to 30 seconds. 5. Repeat 2 to 4 times. Straight-leg raises to the outside    1. Lie on your side, with your affected hip on top. 2. Tighten the front thigh muscles of your top leg to keep your knee straight. 3. Keep your hip and your leg straight in line with the rest of your body, and keep your knee pointing forward. Do not drop your hip back. 4. Lift your top leg straight up toward the ceiling, about 12 inches off the floor. Hold for about 6 seconds, then slowly lower your leg. 5. Repeat 8 to 12 times. Clamshell    1. Lie on your side, with your affected hip on top and your head propped on a pillow. Keep your feet and knees together and your knees bent.   2. Raise your top knee, but keep your feet together. Do not let your hips roll back. Your legs should open up like a clamshell. 3. Hold for 6 seconds. 4. Slowly lower your knee back down. Rest for 10 seconds. 5. Repeat 8 to 12 times. Follow-up care is a key part of your treatment and safety. Be sure to make and go to all appointments, and call your doctor if you are having problems. It's also a good idea to know your test results and keep a list of the medicines you take. Where can you learn more? Go to http://www.weber.com/  Enter H674 in the search box to learn more about \"Hip Bursitis: Exercises. \"  Current as of: July 1, 2021               Content Version: 13.0  © 2006-2021 [employer]. Care instructions adapted under license by "[employer]" (which disclaims liability or warranty for this information). If you have questions about a medical condition or this instruction, always ask your healthcare professional. [place] [address] any warranty or liability for your use of this information.

## 2022-01-28 NOTE — PROGRESS NOTES
HPI:  Carlso Kendrick is a 64y.o. year old female who is here for persistent pain in the left hip. It is most notable when she is standing but also at times lying in bed. The pain is on the lateral aspect of the hip as well as in the groin and the buttocks. Pain does not radiate to the knee. There is no numbness, tingling, or weakness. No bowel or bladder change. She has been taken some Advil off and on and it is helpful. Past Medical History:   Diagnosis Date    Endometriosis     HSV-2 infection     Skin cancer     melanoma x 4       Past Surgical History:   Procedure Laterality Date    HX COLONOSCOPY  09/26/2017    10 yr f/u - Dr. Ruiz Lick  10/2020    HX Romeo Bumpers HX ORTHOPAEDIC  10/2012    Foot surgery    [place] [address]      XR DEXA BONE DENSITY STUDY AXIAL  04.2009       Prior to Admission medications    Medication Sig Start Date End Date Taking? Authorizing Provider   methylPREDNISolone acetate (DEPO-MedroL) 40 mg/mL injection 1 mL by IntraMUSCular route once for 1 dose. 1/28/22 1/28/22 Yes Shay Arita MD   valACYclovir (VALTREX) 1 gram tablet TAKE 1 TABLET BY MOUTH ONE TIME DAILY 10/20/21  Yes Shay Arita MD   TURMERIC, BULK, by Does Not Apply route. Yes Provider, Historical   Cholecalciferol, Vitamin D3, (VITAMIN D3) 1,000 unit cap Take  by mouth. Yes Provider, Historical   fexofenadine (ALLEGRA) 180 mg tablet Take 1 Tab by mouth daily. 7/13/11  Yes Esther Feng III, MD   metronidazole (METROCREAM) 0.75 % topical cream Apply  to affected area two (2) times a day.  Use a thin layer to affected areas after washing    Yes Provider, Historical       Social History     Socioeconomic History    Marital status:      Spouse name: Not on file    Number of children: Not on file    Years of education: Not on file    Highest education level: Not on file   Occupational History    Not on file   Tobacco Use    Smoking status: Former Smoker     Packs/day: 0.25     Years: 11.00     Pack years: 2.75     Quit date: 1973     Years since quittin.9    Smokeless tobacco: Never Used   Vaping Use    Vaping Use: Never used   Substance and Sexual Activity    Alcohol use: No    Drug use: No    Sexual activity: Not on file   Other Topics Concern    Not on file   Social History Narrative    Not on file     Social Determinants of Health     Financial Resource Strain:     Difficulty of Paying Living Expenses: Not on file   Food Insecurity:     Worried About Running Out of Food in the Last Year: Not on file    Loren of Food in the Last Year: Not on file   Transportation Needs:     Lack of Transportation (Medical): Not on file    Lack of Transportation (Non-Medical):  Not on file   Physical Activity:     Days of Exercise per Week: Not on file    Minutes of Exercise per Session: Not on file   Stress:     Feeling of Stress : Not on file   Social Connections:     Frequency of Communication with Friends and Family: Not on file    Frequency of Social Gatherings with Friends and Family: Not on file    Attends Synagogue Services: Not on file    Active Member of [address] [place] [address] or Organizations: Not on file    Attends Club or Organization Meetings: Not on file    Marital Status: Not on file   Intimate Partner Violence:     Fear of Current or Ex-Partner: Not on file    Emotionally Abused: Not on file    Physically Abused: Not on file    Sexually Abused: Not on file   Housing Stability:     Unable to Pay for Housing in the Last Year: Not on file    Number of [place] in the Last Year: Not on file    Unstable Housing in the Last Year: Not on file          ROS  Per HPI    Visit Vitals  BP (!) 144/79 (BP 1 Location: Right arm, BP Patient Position: Sitting, BP Cuff Size: Adult)   Pulse 77   Temp 97.6 °F (36.4 °C) (Temporal)   Resp 16   Ht 5' 5\" (1.651 m)   Wt 128 lb (58.1 kg) SpO2 97%   BMI 21.30 kg/m²         Physical Exam   Physical Examination: General appearance - alert, well appearing, and in no distress  Chest - clear to auscultation, no wheezes, rales or rhonchi, symmetric air entry  Heart - normal rate, regular rhythm, normal S1, S2, no murmurs, rubs, clicks or gallops  Abdomen - soft, nontender, nondistended, no masses or organomegaly  Neurological - alert, oriented, normal speech, no focal findings or movement disorder noted  Musculoskeletal - abnormal exam of right hip with some decreased range of motion and pain with range of motion. , abnormal exam of left hip with tenderness over the greater trochanteric bursa as well as decreased range of motion. No redness or warmth. For the patient's informed consent including the risk of bleeding and infection, the area was prepped with Betadine and ethyl chloride as well as alcohol. The area was injected with 1 cc lidocaine and 40 mg of Depo-Medrol into the area of the greater trochanteric bursa. She tolerated the procedure well. Extremities - peripheral pulses normal, no pedal edema, no clubbing or cyanosis      Assessment/Plan:  Diagnoses and all orders for this visit:    1. Trochanteric bursitis of left hip - treated with injection today. She will do stretches and ice and let me know if not improving.  -     methylPREDNISolone acetate (DEPO-MedroL) 40 mg/mL injection; 1 mL by IntraMUSCular route once for 1 dose. -     DRAIN/INJECT LARGE JOINT/BURSA  -     methylPREDNISolone acetate (DEPO-MEDROL) 80 mg/mL injection 40 mg  -     lidocaine (XYLOCAINE) 10 mg/mL (1 %) injection 1 mL    2. Malignant melanoma of right upper extremity including shoulder (HCC)    3. Arthritis of left hipdocumented on previous x-rays.   If symptoms do not improve, consider physical therapy or orthopedic evaluation.  -     methylPREDNISolone acetate (DEPO-MEDROL) 80 mg/mL injection 40 mg  -     lidocaine (XYLOCAINE) 10 mg/mL (1 %) injection 1 mL Advised her to call back or return to office if symptoms worsen/change/persist.  Discussed expected course/resolution/complications of diagnosis in detail with patient. Medication risks/benefits/costs/interactions/alternatives discussed with patient. She was given an after visit summary which includes diagnoses, current medications, & vitals. She expressed understanding with the diagnosis and plan.

## 2022-01-28 NOTE — PROGRESS NOTES
Chief Complaint   Patient presents with    Hip Pain     left hip, denies injury     Difficulty Walking         1. Have you been to the ER, urgent care clinic since your last visit? Hospitalized since your last visit? No    2. Have you seen or consulted any other health care providers outside of the [address] [place] [address] since your last visit? Include any pap smears or colon screening.  No

## 2022-04-04 ENCOUNTER — OFFICE VISIT (OUTPATIENT)
Dept: INTERNAL MEDICINE CLINIC | Age: 77
End: 2022-04-04
Payer: MEDICARE

## 2022-04-04 VITALS
BODY MASS INDEX: 21.52 KG/M2 | RESPIRATION RATE: 16 BRPM | HEIGHT: 65 IN | HEART RATE: 76 BPM | TEMPERATURE: 97.6 F | SYSTOLIC BLOOD PRESSURE: 126 MMHG | WEIGHT: 129.2 LBS | OXYGEN SATURATION: 97 % | DIASTOLIC BLOOD PRESSURE: 78 MMHG

## 2022-04-04 DIAGNOSIS — G57.02 PIRIFORMIS SYNDROME OF LEFT SIDE: Primary | ICD-10-CM

## 2022-04-04 DIAGNOSIS — M70.62 TROCHANTERIC BURSITIS OF LEFT HIP: ICD-10-CM

## 2022-04-04 PROCEDURE — G8420 CALC BMI NORM PARAMETERS: HCPCS | Performed by: INTERNAL MEDICINE

## 2022-04-04 PROCEDURE — 1101F PT FALLS ASSESS-DOCD LE1/YR: CPT | Performed by: INTERNAL MEDICINE

## 2022-04-04 PROCEDURE — G8536 NO DOC ELDER MAL SCRN: HCPCS | Performed by: INTERNAL MEDICINE

## 2022-04-04 PROCEDURE — G8510 SCR DEP NEG, NO PLAN REQD: HCPCS | Performed by: INTERNAL MEDICINE

## 2022-04-04 PROCEDURE — 1090F PRES/ABSN URINE INCON ASSESS: CPT | Performed by: INTERNAL MEDICINE

## 2022-04-04 PROCEDURE — 99213 OFFICE O/P EST LOW 20 MIN: CPT | Performed by: INTERNAL MEDICINE

## 2022-04-04 PROCEDURE — G8399 PT W/DXA RESULTS DOCUMENT: HCPCS | Performed by: INTERNAL MEDICINE

## 2022-04-04 PROCEDURE — G8427 DOCREV CUR MEDS BY ELIG CLIN: HCPCS | Performed by: INTERNAL MEDICINE

## 2022-04-04 NOTE — PROGRESS NOTES
HPI:  Naman Reddy is a 76y.o. year old female who is here for a follow-up visit. Her hip injection done in January helped some with the bursitis but did not resolve it completely. She is now developed some pain in the left buttocks when sitting. She does have some chronic pain in the groin with rotation of her hip. Some pain that radiates down to the knee but not below. No numbness, tingling, or weakness. No falls. She is taking 1 ibuprofen per day as she is concerned about edema associated with higher doses. No fevers or chills. No sweats. No change in bowel habits. Past Medical History:   Diagnosis Date    Endometriosis     HSV-2 infection     Skin cancer     melanoma x 4       Past Surgical History:   Procedure Laterality Date    HX COLONOSCOPY  09/26/2017    10 yr f/u - Dr. Kapoor Flavin  10/2020    HX Mela Lopes HX ORTHOPAEDIC  10/2012    Foot surgery    [place] [employer]      XR DEXA BONE DENSITY STUDY AXIAL  04.2009       Prior to Admission medications    Medication Sig Start Date End Date Taking? Authorizing Provider   potassium 99 mg tablet Take 99 mg by mouth daily. Yes Provider, Historical   MAGNESIUM CITRATE PO Take  by mouth. Yes Provider, Historical   valACYclovir (VALTREX) 1 gram tablet TAKE 1 TABLET BY MOUTH ONE TIME DAILY 10/20/21  Yes Mayelin Salgado MD   TURMERIC, BULK, by Does Not Apply route. Yes Provider, Historical   Cholecalciferol, Vitamin D3, (VITAMIN D3) 1,000 unit cap Take  by mouth. Yes Provider, Historical   fexofenadine (ALLEGRA) 180 mg tablet Take 1 Tab by mouth daily. 7/13/11  Yes Dawood Sevilla III, MD   metronidazole (METROCREAM) 0.75 % topical cream Apply  to affected area two (2) times a day.  Use a thin layer to affected areas after washing    Yes Provider, Historical       Social History     Socioeconomic History    Marital status:      Spouse name: Not on file    Number of children: Not on file    Years of education: Not on file    Highest education level: Not on file   Occupational History    Not on file   Tobacco Use    Smoking status: Former Smoker     Packs/day: 0.25     Years: 11.00     Pack years: 2.75     Quit date: 1973     Years since quittin.1    Smokeless tobacco: Never Used   Vaping Use    Vaping Use: Never used   Substance and Sexual Activity    Alcohol use: No    Drug use: No    Sexual activity: Not on file   Other Topics Concern    Not on file   Social History Narrative    Not on file     Social Determinants of Health     Financial Resource Strain:     Difficulty of Paying Living Expenses: Not on file   Food Insecurity:     Worried About Running Out of Food in the Last Year: Not on file    Loren of Food in the Last Year: Not on file   Transportation Needs:     Lack of Transportation (Medical): Not on file    Lack of Transportation (Non-Medical):  Not on file   Physical Activity:     Days of Exercise per Week: Not on file    Minutes of Exercise per Session: Not on file   Stress:     Feeling of Stress : Not on file   Social Connections:     Frequency of Communication with Friends and Family: Not on file    Frequency of Social Gatherings with Friends and Family: Not on file    Attends Confucianist Services: Not on file    Active Member of [address] [place] [address] [employer] or Organizations: Not on file    Attends Club or Organization Meetings: Not on file    Marital Status: Not on file   Intimate Partner Violence:     Fear of Current or Ex-Partner: Not on file    Emotionally Abused: Not on file    Physically Abused: Not on file    Sexually Abused: Not on file   Housing Stability:     Unable to Pay for Housing in the Last Year: Not on file    Number of [place] in the Last Year: Not on file    Unstable Housing in the Last Year: Not on file          ROS  Per HPI    Visit Vitals  /78 (BP 1 Location: Right arm, BP Patient Position: Sitting, BP Cuff Size: Small adult)   Pulse 76   Temp 97.6 °F (36.4 °C) (Oral)   Resp 16   Ht 5' 5\" (1.651 m)   Wt 129 lb 3.2 oz (58.6 kg)   SpO2 97%   BMI 21.50 kg/m²         Physical Exam   Physical Examination: General appearance - alert, well appearing, and in no distress  Neurological - alert, oriented, normal speech, no focal findings or movement disorder noted  Musculoskeletal -tenderness across the left greater trochanteric bursa as well as the left IT band. Some tenderness in the left piriformis. There is pain with range of motion in both hips. Negative straight leg raising bilaterally. Extremities - peripheral pulses normal, no pedal edema, no clubbing or cyanosis      Assessment/Plan:  Diagnoses and all orders for this visit:    1. Piriformis syndrome of left siderefer for physical therapy. She may need to have hip replacement in order to resolve her issues. May need to see orthopedics for another injection if symptoms persist.  -     REFERRAL TO PHYSICAL THERAPY    2. Trochanteric bursitis of left hip  -     REFERRAL TO PHYSICAL THERAPY              Advised her to call back or return to office if symptoms worsen/change/persist.  Discussed expected course/resolution/complications of diagnosis in detail with patient. Medication risks/benefits/costs/interactions/alternatives discussed with patient. She was given an after visit summary which includes diagnoses, current medications, & vitals. She expressed understanding with the diagnosis and plan.

## 2022-04-04 NOTE — PROGRESS NOTES
Chief Complaint   Patient presents with   Jennifer Palin     left hip     3 most recent PHQ Screens 4/4/2022   Little interest or pleasure in doing things Not at all   Feeling down, depressed, irritable, or hopeless Not at all   Total Score PHQ 2 0     Abuse Screening Questionnaire 4/4/2022   Do you ever feel afraid of your partner? N   Are you in a relationship with someone who physically or mentally threatens you? N   Is it safe for you to go home? Y     Visit Vitals  /78 (BP 1 Location: Right arm, BP Patient Position: Sitting, BP Cuff Size: Small adult)   Pulse 76   Temp 97.6 °F (36.4 °C) (Oral)   Resp 16   Ht 5' 5\" (1.651 m)   Wt 129 lb 3.2 oz (58.6 kg)   SpO2 97%   BMI 21.50 kg/m²     1. \"Have you been to the ER, urgent care clinic since your last visit? Hospitalized since your last visit? \" no    2. \"Have you seen or consulted any other health care providers outside of the [address] [place] [address] since your last visit? \" no    3. For patients aged 39-70: Has the patient had a colonoscopy / FIT/ Cologuard no      If the patient is female:    4. For patients aged 41-77: Has the patient had a mammogram within the past 2 years [address]      5. For patients aged 21-65: Has the patient had a pap smear?  no

## 2022-09-12 RX ORDER — VALACYCLOVIR HYDROCHLORIDE 1 G/1
TABLET, FILM COATED ORAL
Qty: 90 TABLET | Refills: 3 | Status: SHIPPED | OUTPATIENT
Start: 2022-09-12

## 2022-09-12 NOTE — TELEPHONE ENCOUNTER
Patient requested a 90day script for the Valacyclovir 1 gram tablets.     Please note, new pharmacy:    [facility] # [id number]

## 2022-09-12 NOTE — TELEPHONE ENCOUNTER
Chief Complaint   Patient presents with    Medication Refill     Last Appointment with Dr. Hazel Omalley:  4/4/2022  Future Appointments   Date Time Provider Guerrero Cox   10/24/2022  1:30 PM MD REHAN Carty AMB

## 2022-10-24 ENCOUNTER — OFFICE VISIT (OUTPATIENT)
Dept: INTERNAL MEDICINE CLINIC | Age: 77
End: 2022-10-24
Payer: MEDICARE

## 2022-10-24 VITALS
OXYGEN SATURATION: 97 % | WEIGHT: 130.2 LBS | HEIGHT: 65 IN | RESPIRATION RATE: 12 BRPM | BODY MASS INDEX: 21.69 KG/M2 | TEMPERATURE: 97.6 F | DIASTOLIC BLOOD PRESSURE: 72 MMHG | HEART RATE: 77 BPM | SYSTOLIC BLOOD PRESSURE: 118 MMHG

## 2022-10-24 DIAGNOSIS — B00.9 HSV-2 INFECTION: ICD-10-CM

## 2022-10-24 DIAGNOSIS — N80.9 ENDOMETRIOSIS: ICD-10-CM

## 2022-10-24 DIAGNOSIS — E55.9 VITAMIN D DEFICIENCY: ICD-10-CM

## 2022-10-24 DIAGNOSIS — E78.2 MIXED HYPERLIPIDEMIA: ICD-10-CM

## 2022-10-24 DIAGNOSIS — Z00.00 MEDICARE ANNUAL WELLNESS VISIT, SUBSEQUENT: Primary | ICD-10-CM

## 2022-10-24 PROCEDURE — G8536 NO DOC ELDER MAL SCRN: HCPCS | Performed by: INTERNAL MEDICINE

## 2022-10-24 PROCEDURE — G8427 DOCREV CUR MEDS BY ELIG CLIN: HCPCS | Performed by: INTERNAL MEDICINE

## 2022-10-24 PROCEDURE — G8510 SCR DEP NEG, NO PLAN REQD: HCPCS | Performed by: INTERNAL MEDICINE

## 2022-10-24 PROCEDURE — 1101F PT FALLS ASSESS-DOCD LE1/YR: CPT | Performed by: INTERNAL MEDICINE

## 2022-10-24 PROCEDURE — G8420 CALC BMI NORM PARAMETERS: HCPCS | Performed by: INTERNAL MEDICINE

## 2022-10-24 PROCEDURE — 99213 OFFICE O/P EST LOW 20 MIN: CPT | Performed by: INTERNAL MEDICINE

## 2022-10-24 PROCEDURE — G0439 PPPS, SUBSEQ VISIT: HCPCS | Performed by: INTERNAL MEDICINE

## 2022-10-24 PROCEDURE — G8399 PT W/DXA RESULTS DOCUMENT: HCPCS | Performed by: INTERNAL MEDICINE

## 2022-10-24 PROCEDURE — 1090F PRES/ABSN URINE INCON ASSESS: CPT | Performed by: INTERNAL MEDICINE

## 2022-10-24 RX ORDER — TETANUS TOXOID, REDUCED DIPHTHERIA TOXOID AND ACELLULAR PERTUSSIS VACCINE, ADSORBED 5; 2.5; 8; 8; 2.5 [IU]/.5ML; [IU]/.5ML; UG/.5ML; UG/.5ML; UG/.5ML
0.5 SUSPENSION INTRAMUSCULAR ONCE
Qty: 0.5 ML | Refills: 0 | Status: SHIPPED | OUTPATIENT
Start: 2022-10-24 | End: 2022-10-24

## 2022-10-24 RX ORDER — MELOXICAM 15 MG/1
TABLET ORAL
COMMUNITY
Start: 2022-08-17

## 2022-10-24 NOTE — PATIENT INSTRUCTIONS

## 2022-10-24 NOTE — PROGRESS NOTES
This is the Subsequent Medicare Annual Wellness Exam, performed 12 months or more after the Initial AWV or the last Subsequent AWV    I have reviewed the patient's medical history in detail and updated the computerized patient record. As well as a follow-up visit. She is recently been seen by orthopedist for bilateral hip pain and lower back pain. She has been diagnosed with scoliosis, degenerative changes in her back, as well as osteoarthritis of the hips. She has been using meloxicam given to her by orthopedics on an as-needed basis and it is helpful. She is tolerating it well without any difficulty. Otherwise a complete review of systems is negative. ROS - Per HPI    Physical Examination: General appearance - alert, well appearing, and in no distress  Ears - bilateral TM's and external ear canals normal  Mouth - mucous membranes moist, pharynx normal without lesions  Neck - supple, no significant adenopathy  Lymphatics - no palpable lymphadenopathy, no hepatosplenomegaly  Chest - clear to auscultation, no wheezes, rales or rhonchi, symmetric air entry  Heart - normal rate, regular rhythm, normal S1, S2, no murmurs, rubs, clicks or gallops  Abdomen - soft, nontender, nondistended, no masses or organomegaly  Back exam - full range of motion, no tenderness, palpable spasm or pain on motion  Neurological - alert, oriented, normal speech, no focal findings or movement disorder noted  Musculoskeletal - no joint tenderness, deformity or swelling  Extremities - peripheral pulses normal, no pedal edema, no clubbing or cyanosis      Assessment/Plan   Education and counseling provided:  Are appropriate based on today's review and evaluation  Screening Mammography    1. Medicare annual wellness visit, subsequent  2. Endometriosis - stable and seeing GYN  3. HSV-2 infection - as needed meds  4. Mixed hyperlipidemia - Diet controlled. Recheck levels in January. 5. Vitamin D deficiency - stable  6.  DJD - hips and back. I will reorder the meds as needed at her request but also informed her of the risk and benefits of them. Discussed the risk and benefit of hip replacement surgery and she will consider when unable to tolerate the discomfort. Depression Risk Factor Screening     3 most recent PHQ Screens 10/24/2022   Little interest or pleasure in doing things Not at all   Feeling down, depressed, irritable, or hopeless Not at all   Total Score PHQ 2 0       Alcohol & Drug Abuse Risk Screen   Do you average more than 1 drink per night or more than 7 drinks a week?: No  On any one occasion in the past three months have you had more than 3 drinks containing alcohol?: No          Functional Ability and Level of Safety   Hearing:  Hearing: Patient reports hearing is good    Activities of Daily Living: The home contains: [employer]  Functional ADLs: Patient does total self care   Ambulation:  Patient ambulates: with no difficulty   Fall Risk:  Fall Risk Assessment, last 12 mths 10/24/2022   Able to walk? Yes   Fall in past 12 months? 0   Do you feel unsteady? 0   Are you worried about falling 0   Number of falls in past 12 months -   Fall with injury?  -     Abuse Screen:  Do you ever feel afraid of your partner?: No  Are you in a relationship with someone who physically or mentally threatens you?: No  Is it safe for you to go home?: Yes      Cognitive Screening   Has your family/caregiver stated any concerns about your memory?: No       Health Maintenance Due     Health Maintenance Due   Topic Date Due    DTaP/Tdap/Td series (2 - Td or Tdap) 07/18/2022       Patient Care Team   Patient Care Team:  Maciej Beard MD as PCP - General  Maciej Beard MD as PCP - REHABILITATION [facility] Empaneled Provider  Beto Garza MD (Obstetrics & Gynecology)  Gonzalo Lozada MD (Ophthalmology)    History     Patient Active Problem List   Diagnosis Code    Endometriosis N80.9    HSV-2 infection B00.9    Mixed hyperlipidemia E78.2 [facility] C43.9    Advance directive discussed with patient Z70.80    Vitamin D deficiency E55.9     Past Medical History:   Diagnosis Date    Arthritis     osteo    Autoimmune disease ([facility] 2003 or so    sjogren's syndrome    Chronic pain     Endometriosis     HSV-2 infection     Skin cancer     melanoma x 4      Past Surgical History:   Procedure Laterality Date    HX COLONOSCOPY  09/26/2017    10 yr f/u - Dr. Danielle Graff HENNEKA  cataract 2020    HX HYSTERECTOMY      HX MOHS PROCEDURES  10/2020    HX ORTHOPAEDIC      FROZEN SHOULDER    HX ORTHOPAEDIC  10/2012    Foot surgery    [place] MANIPULATN SHLDR JT W ANESTHESIA      XR DEXA BONE DENSITY STUDY AXIAL  04/2009     Current Outpatient Medications   Medication Sig Dispense Refill    meloxicam (MOBIC) 15 mg tablet TAKE 1 TABLET BY MOUTH EVERY DAY FOR 2 WEEKS, THEN 1 TABLET AS NEEDED FOR PAIN WITH FOOD      valACYclovir (VALTREX) 1 gram tablet TAKE 1 TABLET BY MOUTH ONE TIME DAILY 90 Tablet 3    MAGNESIUM CITRATE PO Take  by mouth. TURMERIC, BULK, by Does Not Apply route. cholecalciferol (VITAMIN D3) 25 mcg (1,000 unit) cap Take  by mouth. fexofenadine (ALLEGRA) 180 mg tablet Take 1 Tab by mouth daily. 90 Tab 3    potassium 99 mg tablet Take 99 mg by mouth daily. metronidazole (METROCREAM) 0.75 % topical cream Apply  to affected area two (2) times a day. Use a thin layer to affected areas after washing        No Known Allergies    Family History   Problem Relation Age of Onset    Heart Disease Mother     Cancer Father         prostate, skin cancers    Heart Disease Father         heart attack in his early 80's    Stroke Father     Cancer Brother         melanoma    Heart Disease Brother         left ventricle issue all adult life.  pacemaker    Cancer Daughter         Melanoma    Cancer Brother         Skin     Social History     Tobacco Use    Smoking status: Former     Packs/day: 0.25     Years: 11.00     Pack years: 2.75     Types: Cigarettes     Quit date: 1973     Years since quittin.6    Smokeless tobacco: Never   Substance Use Topics    Alcohol use: Never         Livier Simpson MD

## 2022-10-27 ENCOUNTER — TELEPHONE (OUTPATIENT)
Dept: INTERNAL MEDICINE CLINIC | Age: 77
End: 2022-10-27

## 2022-10-27 NOTE — TELEPHONE ENCOUNTER
----- Message from [employer] sent at 10/27/2022  4:19 PM EDT -----  Subject: Referral Request    Reason for referral request? Dermatologist Specialist & Surgeon   Provider patient wants to be referred to(if known): Lucia Mendez    Provider Phone Number(if known): Additional Information for Provider? Cancer of the face. Mohs Surgery.  Per   [employer]. request. Surgery date is November 7th.   ---------------------------------------------------------------------------  --------------  Catracho Arizmendi K    [phone]; OK to leave message on voicemail  ---------------------------------------------------------------------------  --------------

## 2022-10-28 NOTE — TELEPHONE ENCOUNTER
Spoke with patient. Her insurance does not require a referral--says so on the back of her insurance card even. Patient stated she has never needed a referral in the past for this exact procedure either. Patient verbalized understanding and appreciation for the call back.

## 2023-03-30 ENCOUNTER — OFFICE VISIT (OUTPATIENT)
Dept: INTERNAL MEDICINE CLINIC | Age: 78
End: 2023-03-30
Attending: INTERNAL MEDICINE
Payer: MEDICARE

## 2023-03-30 ENCOUNTER — HOSPITAL ENCOUNTER (OUTPATIENT)
Dept: GENERAL RADIOLOGY | Age: 78
Discharge: HOME | End: 2023-03-30
Attending: INTERNAL MEDICINE

## 2023-03-30 VITALS
RESPIRATION RATE: 18 BRPM | SYSTOLIC BLOOD PRESSURE: 122 MMHG | TEMPERATURE: 97.5 F | OXYGEN SATURATION: 97 % | HEART RATE: 74 BPM | DIASTOLIC BLOOD PRESSURE: 72 MMHG | HEIGHT: 65 IN | BODY MASS INDEX: 21.89 KG/M2 | WEIGHT: 131.4 LBS

## 2023-03-30 DIAGNOSIS — C43.61 MALIGNANT MELANOMA OF RIGHT UPPER EXTREMITY INCLUDING SHOULDER (HCC): ICD-10-CM

## 2023-03-30 DIAGNOSIS — M79.672 LEFT FOOT PAIN: ICD-10-CM

## 2023-03-30 DIAGNOSIS — M79.672 LEFT FOOT PAIN: Primary | ICD-10-CM

## 2023-03-30 PROCEDURE — G8420 CALC BMI NORM PARAMETERS: HCPCS | Performed by: INTERNAL MEDICINE

## 2023-03-30 PROCEDURE — G8427 DOCREV CUR MEDS BY ELIG CLIN: HCPCS | Performed by: INTERNAL MEDICINE

## 2023-03-30 PROCEDURE — G8536 NO DOC ELDER MAL SCRN: HCPCS | Performed by: INTERNAL MEDICINE

## 2023-03-30 PROCEDURE — G8399 PT W/DXA RESULTS DOCUMENT: HCPCS | Performed by: INTERNAL MEDICINE

## 2023-03-30 PROCEDURE — G8432 DEP SCR NOT DOC, RNG: HCPCS | Performed by: INTERNAL MEDICINE

## 2023-03-30 PROCEDURE — 99213 OFFICE O/P EST LOW 20 MIN: CPT | Performed by: INTERNAL MEDICINE

## 2023-03-30 PROCEDURE — 1101F PT FALLS ASSESS-DOCD LE1/YR: CPT | Performed by: INTERNAL MEDICINE

## 2023-03-30 PROCEDURE — 1090F PRES/ABSN URINE INCON ASSESS: CPT | Performed by: INTERNAL MEDICINE

## 2023-03-30 RX ORDER — FLUOROURACIL 5 MG/G
CREAM TOPICAL
COMMUNITY

## 2023-03-30 NOTE — PROGRESS NOTES
Chief Complaint   Patient presents with    Injury     Left foot     Blood pressure 122/72, pulse 74, temperature 97.5 °F (36.4 °C), temperature source Temporal, resp. rate 18, height 5' 5\" (1.651 m), weight 131 lb 6.4 oz (59.6 kg), SpO2 97 %.

## 2023-03-30 NOTE — PROGRESS NOTES
HPI:  Julia Bull is a [de-identified]y.o. year old female who is here for a pain in the left foot. It started after she apparently stubbed her toe in a parking lot. Since then she has had pain over the first metatarsal and has been walking on the side of her foot. It has not significantly improved with time. No numbness, tingling, or weakness. She did have some bruising associated with it. Past Medical History:   Diagnosis Date    Arthritis     osteo    Autoimmune disease ([facility] 2003 or so    sjogren's syndrome    Chronic pain     Endometriosis     HSV-2 infection     Skin cancer     melanoma x 4       Past Surgical History:   Procedure Laterality Date    HX COLONOSCOPY  09/26/2017    10 yr f/u - Dr. Rolando Solorio HEENT  cataract 2020    HX HYSTERECTOMY      HX MOHS PROCEDURES  10/2020    HX ORTHOPAEDIC      FROZEN SHOULDER    HX ORTHOPAEDIC  10/2012    Foot surgery    [facility] SHLDR JT W ANESTHESIA      XR DEXA BONE DENSITY STUDY AXIAL  04/2009       Prior to Admission medications    Medication Sig Start Date End Date Taking? Authorizing Provider   [facility]) 0.5 % topical cream fluorouracil   Yes Provider, Historical   valACYclovir (VALTREX) 1 gram tablet TAKE 1 TABLET BY MOUTH ONE TIME DAILY 9/12/22  Yes Rosetta Chandler MD   potassium 99 mg tablet Take 99 mg by mouth daily. Yes Provider, Historical   MAGNESIUM CITRATE PO Take  by mouth. Yes Provider, Historical   TURMERIC, BULK, by Does Not Apply route. Yes Provider, Historical   cholecalciferol (VITAMIN D3) 25 mcg (1,000 unit) cap Take  by mouth. Yes Provider, Historical   fexofenadine (ALLEGRA) 180 mg tablet Take 1 Tab by mouth daily.  7/13/11  Yes Rosetta Chandler MD   meloxicam (MOBIC) 15 mg tablet TAKE 1 TABLET BY MOUTH EVERY DAY FOR 2 WEEKS, THEN 1 TABLET AS NEEDED FOR PAIN WITH FOOD  Patient not taking: Reported on 3/30/2023 8/17/22 3/30/23  Provider, Historical   metronidazole (METROCREAM) 0.75 % topical cream Apply  to affected area two (2) times a day. Use a thin layer to affected areas after washing     Provider, Historical       Social History     Socioeconomic History    Marital status:      Spouse name: Not on file    Number of children: Not on file    Years of education: Not on file    Highest education level: Not on file   Occupational History    Not on file   Tobacco Use    Smoking status: Former     Packs/day: 0.25     Years: 11.00     Pack years: 2.75     Types: Cigarettes     Quit date: 1973     Years since quittin.1    Smokeless tobacco: Never   Vaping Use    Vaping Use: Never used   Substance and Sexual Activity    Alcohol use: Never    Drug use: Never    Sexual activity: Not Currently     Partners: Male     Birth control/protection: Pill     Comment: off it some yrs back   Other Topics Concern    Not on file   Social History Narrative    Not on file     Social Determinants of Health     Financial Resource Strain: Low Risk     Difficulty of Paying Living Expenses: Not hard at all   Food Insecurity: No Food Insecurity    Worried About Running Out of Food in the Last Year: Never true    Ran Out of Food in the Last Year: Never true   Transportation Needs: Not on file   Physical Activity: Not on file   Stress: Not on file   Social Connections: Not on file   Intimate Partner Violence: Not on file   Housing Stability: Not on file          ROS  Advil has helped. Visit Vitals  /72   Pulse 74   Temp 97.5 °F (36.4 °C) (Temporal)   Resp 18   Ht 5' 5\" (1.651 m)   Wt 131 lb 6.4 oz (59.6 kg)   SpO2 97%   BMI 21.87 kg/m²         Physical Exam   Physical Examination: General appearance - alert, well appearing, and in no distress  Neurological - alert, oriented, normal speech, no focal findings or movement disorder noted  Musculoskeletal - abnormal exam of left foot with a marked bunion deformity. There is tenderness over the first metatarsal both distally and proximally.   Some tenderness in the distal aspect of the left great toe as well. No swelling or redness. Extremities - peripheral pulses normal, no pedal edema, no clubbing or cyanosis      Assessment/Plan:  Diagnoses and all orders for this visit:    1. Left foot pain-? Degenerative in nature versus fracture. We will obtain an x-ray and further treatment pending those results. -     XR FOOT LT MIN 3 V; Future    2. Malignant melanoma of right upper extremity including shoulder (HCC)-up-to-date with visits with dermatology and in remission. Advised her to call back or return to office if symptoms worsen/change/persist.  Discussed expected course/resolution/complications of diagnosis in detail with patient. Medication risks/benefits/costs/interactions/alternatives discussed with patient. She was given an after visit summary which includes diagnoses, current medications, & vitals. She expressed understanding with the diagnosis and plan.

## 2023-04-03 NOTE — PROGRESS NOTES
Reviewed result note w/ patient. Reports no pain in the 4th toe, pain is left side of big toe. Reviewed report did show arthritis and tissue swelling in this area. Pt reports pain has somewhat improved but not resolved. Advised pt to continue activities as tolerated, rest, ice, tylenol prn. Pt voiced understanding.

## 2023-04-22 ENCOUNTER — HOSPITAL ENCOUNTER (EMERGENCY)
Age: 78
Discharge: HOME | End: 2023-04-22
Attending: EMERGENCY MEDICINE
Payer: MEDICARE

## 2023-04-22 VITALS
HEIGHT: 65 IN | SYSTOLIC BLOOD PRESSURE: 146 MMHG | TEMPERATURE: 97.9 F | WEIGHT: 128 LBS | OXYGEN SATURATION: 100 % | HEART RATE: 72 BPM | RESPIRATION RATE: 16 BRPM | DIASTOLIC BLOOD PRESSURE: 94 MMHG | BODY MASS INDEX: 21.33 KG/M2

## 2023-04-22 DIAGNOSIS — S30.860A TICK BITE OF PELVIC REGION, INITIAL ENCOUNTER: Primary | ICD-10-CM
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
## 2023-04-22 PROCEDURE — 99283 EMERGENCY DEPT VISIT LOW MDM: CPT

## 2023-04-22 RX ORDER — DOXYCYCLINE HYCLATE 100 MG
200 TABLET ORAL ONCE
Qty: 2 TABLET | Refills: 0 | Status: SHIPPED | OUTPATIENT
Start: 2023-04-22 | End: 2023-04-22

## 2023-04-22 RX ORDER — DOXYCYCLINE HYCLATE 100 MG
200 TABLET ORAL ONCE
Qty: 2 TABLET | Refills: 0 | Status: SHIPPED | OUTPATIENT
Start: 2023-04-22 | End: 2023-04-22 | Stop reason: SDUPTHER

## 2023-04-22 RX ORDER — POTASSIUM CHLORIDE 750 MG/1
TABLET, EXTENDED RELEASE ORAL
COMMUNITY

## 2023-04-22 NOTE — ED TRIAGE NOTES
Presented to the ED with a tick bite to the right hip that she states the tick has been on for 6 days. Has a history of lyme's disease and was treated with doxy. Red raised area on right lateral hip.

## 2023-04-22 NOTE — ED PROVIDER NOTES
[de-identified] with a history of Sjogren's syndrome, arthritis, skin cancer. She presents with complaints of a tick bite. She believes it has been on possibly 6 days. She pulled it off this morning. It is on her left hip. She has a raised red area at the site of the bite. She has previously had Lyme's disease, and is concerned about that. No fever, headache, joint pain, or other complaints. Past Medical History:   Diagnosis Date    Arthritis     osteo    Autoimmune disease ([facility] 2003 or so    sjogren's syndrome    Chronic pain     Endometriosis     HSV-2 infection     Skin cancer     melanoma x 4       Past Surgical History:   Procedure Laterality Date    HX COLONOSCOPY  2017    10 yr f/u - Dr. Hank Harris HEENT  cataract     HX HYSTERECTOMY      HX MOHS PROCEDURES  10/2020    HX ORTHOPAEDIC      FROZEN SHOULDER    HX ORTHOPAEDIC  10/2012    Foot surgery    [place] MANIPULATN SHLDR JT W ANESTHESIA      XR DEXA BONE DENSITY STUDY AXIAL  2009         Family History:   Problem Relation Age of Onset    Heart Disease Mother     Cancer Father         prostate, skin cancers    Heart Disease Father         heart attack in his early 80's    Stroke Father     Cancer Brother         melanoma    Heart Disease Brother         left ventricle issue all adult life.  pacemaker    Cancer Daughter         Melanoma    Cancer Brother         Skin       Social History     Socioeconomic History    Marital status:      Spouse name: Not on file    Number of children: Not on file    Years of education: Not on file    Highest education level: Not on file   Occupational History    Not on file   Tobacco Use    Smoking status: Former     Packs/day: 0.25     Years: 11.00     Pack years: 2.75     Types: Cigarettes     Quit date: 1973     Years since quittin.1    Smokeless tobacco: Never   Vaping Use    Vaping Use: Never used   Substance and Sexual Activity    Alcohol use: Never    Drug use: Never    Sexual activity: Not Currently     Partners: Male     Birth control/protection: Pill     Comment: off it some yrs back   Other Topics Concern    Not on file   Social History Narrative    Not on file     Social Determinants of Health     Financial Resource Strain: Low Risk     Difficulty of Paying Living Expenses: Not hard at all   Food Insecurity: No Food Insecurity    Worried About Running Out of Food in the Last Year: Never true    Ran Out of Food in the Last Year: Never true   Transportation Needs: Not on file   Physical Activity: Not on file   Stress: Not on file   Social Connections: Not on file   Intimate Partner Violence: Not on file   Housing Stability: Not on file         ALLERGIES: Seasonale [levonorgestrel-ethinyl estrad]    Review of Systems   All other systems reviewed and are negative. Vitals:    04/22/23 0931   BP: (!) 146/94   Pulse: 72   Resp: 16   Temp: 97.9 °F (36.6 °C)   SpO2: 100%   Weight: 58.1 kg (128 lb)   Height: 5' 5\" (1.651 m)            Physical Exam  Vitals and nursing note reviewed. Constitutional:       Appearance: She is well-developed. HENT:      Head: Normocephalic and atraumatic. Eyes:      Conjunctiva/sclera: Conjunctivae normal.   Neck:      Trachea: No tracheal deviation. Cardiovascular:      Rate and Rhythm: Normal rate. Pulmonary:      Effort: Pulmonary effort is normal.   Abdominal:      General: There is no distension. Skin:     General: Skin is dry. Comments: Small, raised, red area left hip consistent with a tick bite. Neurological:      Mental Status: She is alert. Medical Decision Making  Risk  Prescription drug management. Procedures    Assessment/plan: Tick bite. No symptoms/signs of Lyme's disease but she has had it previously. The tick has been on for as long as 6 days. Will cover with a prophylactic dose of doxycycline. Reassuring appearance/exam with stable vital signs. Return precautions.   Quinn Jean MD  9:50 AM

## 2023-10-26 ENCOUNTER — OFFICE VISIT (OUTPATIENT)
Age: 78
End: 2023-10-26
Payer: MEDICARE

## 2023-10-26 VITALS
HEIGHT: 65 IN | OXYGEN SATURATION: 98 % | HEART RATE: 75 BPM | RESPIRATION RATE: 15 BRPM | TEMPERATURE: 97.6 F | WEIGHT: 128.8 LBS | DIASTOLIC BLOOD PRESSURE: 72 MMHG | SYSTOLIC BLOOD PRESSURE: 140 MMHG | BODY MASS INDEX: 21.46 KG/M2

## 2023-10-26 DIAGNOSIS — M70.62 TROCHANTERIC BURSITIS, LEFT HIP: ICD-10-CM

## 2023-10-26 DIAGNOSIS — E78.2 MIXED HYPERLIPIDEMIA: ICD-10-CM

## 2023-10-26 DIAGNOSIS — Z00.00 MEDICARE ANNUAL WELLNESS VISIT, SUBSEQUENT: Primary | ICD-10-CM

## 2023-10-26 DIAGNOSIS — E55.9 VITAMIN D DEFICIENCY, UNSPECIFIED: ICD-10-CM

## 2023-10-26 DIAGNOSIS — G89.29 NECK PAIN, CHRONIC: ICD-10-CM

## 2023-10-26 DIAGNOSIS — C43.61 MALIGNANT MELANOMA OF RIGHT UPPER LIMB, INCLUDING SHOULDER (HCC): ICD-10-CM

## 2023-10-26 DIAGNOSIS — K64.9 HEMORRHOIDS, UNSPECIFIED HEMORRHOID TYPE: ICD-10-CM

## 2023-10-26 DIAGNOSIS — M54.2 NECK PAIN, CHRONIC: ICD-10-CM

## 2023-10-26 DIAGNOSIS — M19.011 ARTHRITIS OF RIGHT SHOULDER REGION: ICD-10-CM

## 2023-10-26 PROCEDURE — G0439 PPPS, SUBSEQ VISIT: HCPCS | Performed by: INTERNAL MEDICINE

## 2023-10-26 PROCEDURE — 99214 OFFICE O/P EST MOD 30 MIN: CPT | Performed by: INTERNAL MEDICINE

## 2023-10-26 PROCEDURE — 1123F ACP DISCUSS/DSCN MKR DOCD: CPT | Performed by: INTERNAL MEDICINE

## 2023-10-26 RX ORDER — DOXYCYCLINE HYCLATE 100 MG/1
100 CAPSULE ORAL 2 TIMES DAILY
COMMUNITY
Start: 2023-08-14 | End: 2023-10-26

## 2023-10-26 RX ORDER — DOXYCYCLINE HYCLATE 20 MG
20 TABLET ORAL 2 TIMES DAILY
Qty: 20 TABLET | Refills: 0 | COMMUNITY
Start: 2023-10-26

## 2023-10-26 ASSESSMENT — PATIENT HEALTH QUESTIONNAIRE - PHQ9
SUM OF ALL RESPONSES TO PHQ QUESTIONS 1-9: 0
1. LITTLE INTEREST OR PLEASURE IN DOING THINGS: 0
SUM OF ALL RESPONSES TO PHQ QUESTIONS 1-9: 0
2. FEELING DOWN, DEPRESSED OR HOPELESS: 0
SUM OF ALL RESPONSES TO PHQ9 QUESTIONS 1 & 2: 0

## 2023-10-26 ASSESSMENT — LIFESTYLE VARIABLES
HOW OFTEN DO YOU HAVE A DRINK CONTAINING ALCOHOL: NEVER
HOW MANY STANDARD DRINKS CONTAINING ALCOHOL DO YOU HAVE ON A TYPICAL DAY: PATIENT DOES NOT DRINK

## 2023-10-26 NOTE — PROGRESS NOTES
Medicare Annual Wellness Visit    Norbert Posey is here for Medicare AWV    Assessment & Plan   Medicare annual wellness visit, subsequent  Malignant melanoma of left upper limb, including shoulder (HCC)-followed by dermatology. Mixed hyperlipidemia-diet controlled. Repeat lipids to be sure that stable. -     Comprehensive Metabolic Panel; Future  -     CBC with Auto Differential; Future  -     Lipid Panel; Future  -     TSH; Future  Trochanteric bursitis, left hip-with some ongoing IT band syndrome. Offered physical therapy and she wishes to defer. We will try stretching exercises at home. Vitamin D deficiency, unspecified-previously repleted. Arthritis of right shoulder region-previous frozen shoulder. We will do stretches at home and she will let me know if not improving. Hemorrhoids, unspecified hemorrhoid type-some mild discomfort earlier when she was having more constipation issues. Reassurance to her today and she will let me know if she wishes to see a surgeon. Neck pain, chronic-muscle pain. We will treat with stretching exercises. Recommendations for Preventive Services Due: see orders and patient instructions/AVS.  Recommended screening schedule for the next 5-10 years is provided to the patient in written form: see Patient Instructions/AVS.     Return in 1 year (on 10/26/2024). Subjective   Presents for a wellness exam as well as a follow-up. Has noted some increasing issues with pain in the right shoulder as well as the right neck area. Her neck has been painful for 35 years she reports. Some pain in both of her hips as well. Some dental issues as well. She is seeing dermatology regularly for several issues as well as ophthalmology for recurrent chalazion. Patient's complete Health Risk Assessment and screening values have been reviewed and are found in Flowsheets.  The following problems were reviewed today and where indicated follow up appointments were made and/or

## 2023-11-01 DIAGNOSIS — E78.2 MIXED HYPERLIPIDEMIA: ICD-10-CM

## 2023-11-02 LAB
ALBUMIN SERPL-MCNC: 4 G/DL (ref 3.5–5)
ALBUMIN/GLOB SERPL: 1.3 (ref 1.1–2.2)
ALP SERPL-CCNC: 103 U/L (ref 45–117)
ALT SERPL-CCNC: 19 U/L (ref 12–78)
ANION GAP SERPL CALC-SCNC: 3 MMOL/L (ref 5–15)
AST SERPL-CCNC: 16 U/L (ref 15–37)
BASOPHILS # BLD: 0.1 K/UL (ref 0–0.1)
BASOPHILS NFR BLD: 1 % (ref 0–1)
BILIRUB SERPL-MCNC: 0.4 MG/DL (ref 0.2–1)
BUN SERPL-MCNC: 13 MG/DL (ref 6–20)
BUN/CREAT SERPL: 15 (ref 12–20)
CALCIUM SERPL-MCNC: 9.4 MG/DL (ref 8.5–10.1)
CHLORIDE SERPL-SCNC: 106 MMOL/L (ref 97–108)
CHOLEST SERPL-MCNC: 226 MG/DL
CO2 SERPL-SCNC: 32 MMOL/L (ref 21–32)
CREAT SERPL-MCNC: 0.86 MG/DL (ref 0.55–1.02)
DIFFERENTIAL METHOD BLD: NORMAL
EOSINOPHIL # BLD: 0.1 K/UL (ref 0–0.4)
EOSINOPHIL NFR BLD: 2 % (ref 0–7)
ERYTHROCYTE [DISTWIDTH] IN BLOOD BY AUTOMATED COUNT: 11.6 % (ref 11.5–14.5)
GLOBULIN SER CALC-MCNC: 3.2 G/DL (ref 2–4)
GLUCOSE SERPL-MCNC: 80 MG/DL (ref 65–100)
HCT VFR BLD AUTO: 43 % (ref 35–47)
HDLC SERPL-MCNC: 68 MG/DL
HDLC SERPL: 3.3 (ref 0–5)
HGB BLD-MCNC: 14.2 G/DL (ref 11.5–16)
IMM GRANULOCYTES # BLD AUTO: 0 K/UL (ref 0–0.04)
IMM GRANULOCYTES NFR BLD AUTO: 0 % (ref 0–0.5)
LDLC SERPL CALC-MCNC: 133 MG/DL (ref 0–100)
LYMPHOCYTES # BLD: 3.4 K/UL (ref 0.8–3.5)
LYMPHOCYTES NFR BLD: 46 % (ref 12–49)
MCH RBC QN AUTO: 32.5 PG (ref 26–34)
MCHC RBC AUTO-ENTMCNC: 33 G/DL (ref 30–36.5)
MCV RBC AUTO: 98.4 FL (ref 80–99)
MONOCYTES # BLD: 0.5 K/UL (ref 0–1)
MONOCYTES NFR BLD: 7 % (ref 5–13)
NEUTS SEG # BLD: 3.3 K/UL (ref 1.8–8)
NEUTS SEG NFR BLD: 44 % (ref 32–75)
NRBC # BLD: 0 K/UL (ref 0–0.01)
NRBC BLD-RTO: 0 PER 100 WBC
PLATELET # BLD AUTO: 263 K/UL (ref 150–400)
PMV BLD AUTO: 10 FL (ref 8.9–12.9)
POTASSIUM SERPL-SCNC: 5.4 MMOL/L (ref 3.5–5.1)
PROT SERPL-MCNC: 7.2 G/DL (ref 6.4–8.2)
RBC # BLD AUTO: 4.37 M/UL (ref 3.8–5.2)
SODIUM SERPL-SCNC: 141 MMOL/L (ref 136–145)
TRIGL SERPL-MCNC: 125 MG/DL
TSH SERPL DL<=0.05 MIU/L-ACNC: 1.66 UIU/ML (ref 0.36–3.74)
VLDLC SERPL CALC-MCNC: 25 MG/DL
WBC # BLD AUTO: 7.4 K/UL (ref 3.6–11)

## 2023-12-04 ENCOUNTER — TELEPHONE (OUTPATIENT)
Age: 78
End: 2023-12-04

## 2023-12-04 RX ORDER — VALACYCLOVIR HYDROCHLORIDE 1 G/1
1000 TABLET, FILM COATED ORAL DAILY
Qty: 30 TABLET | Refills: 1 | Status: SHIPPED | OUTPATIENT
Start: 2023-12-04

## 2023-12-04 NOTE — TELEPHONE ENCOUNTER
Chief Complaint   Patient presents with    Medication Refill     Last Appointment with Dr. Bonilla Anderson:  10/26/2023   No future appointments.

## 2023-12-04 NOTE — TELEPHONE ENCOUNTER
Medication Refill Request    Ryne Ansari is requesting a refill of the following medication(s):     valACYclovir (VALTREX) 1 g tablet                 Please send refill to:     [id number] Pranay Mohamud [phone] - F [phone]       Pt request 30 day supply.

## 2024-02-07 ENCOUNTER — TELEPHONE (OUTPATIENT)
Age: 79
End: 2024-02-07

## 2024-02-07 DIAGNOSIS — B35.1 NAIL FUNGUS: ICD-10-CM

## 2024-02-07 DIAGNOSIS — M21.619 BUNION: Primary | ICD-10-CM

## 2024-02-07 NOTE — TELEPHONE ENCOUNTER
----- Message from Nan Barahona sent at 2/7/2024  3:44 PM EST -----  Subject: Referral Request    Reason for referral request? Podiatry   Provider patient wants to be referred to(if known): Montana Gaines    Provider Phone Number(if known):    Additional Information for Provider? Fax [phone]   ---------------------------------------------------------------------------  --------------  CALL BACK INFO    [phone]; OK to leave message on voicemail  ---------------------------------------------------------------------------  --------------

## 2024-02-08 NOTE — TELEPHONE ENCOUNTER
Pt does not have appt schedule at this time.  She will call back w/ appt date for insurance referral.  PCP referral placed:    Orders Placed This Encounter    [facility] - Montana Gaines DPM, [facility] ([facility])     Referral Priority:   Routine     Referral Type:   Eval and Treat     Referral Reason:   Specialty Services Required     Referred to Provider:   Montnaa Gaines DPM     Requested Specialty:   PODIATRIST FOOT AND ANKLE SURGERY     Number of Visits Requested:   1       The above orders were approved via [facility] per Dr. Art Nolasco, III.

## 2024-02-13 ENCOUNTER — TELEPHONE (OUTPATIENT)
Age: 79
End: 2024-02-13

## 2024-02-14 NOTE — TELEPHONE ENCOUNTER
No insurance referral required.  Faxed PCP referral and last OV note to Dr. Gaines at the fax number provided.  Confirmation received.    FYI sent to pool.

## 2024-04-17 ENCOUNTER — TELEPHONE (OUTPATIENT)
Age: 79
End: 2024-04-17

## 2024-04-17 DIAGNOSIS — Z85.828 PERSONAL HISTORY OF SKIN CANCER: Primary | ICD-10-CM

## 2024-04-17 DIAGNOSIS — Z85.820 HISTORY OF MELANOMA: ICD-10-CM

## 2024-04-17 RX ORDER — VALACYCLOVIR HYDROCHLORIDE 1 G/1
1000 TABLET, FILM COATED ORAL DAILY
Qty: 90 TABLET | Refills: 0 | Status: SHIPPED | OUTPATIENT
Start: 2024-04-17

## 2024-04-17 NOTE — TELEPHONE ENCOUNTER
Chief Complaint   Patient presents with    Referral - General     Last Appointment with Dr. Art Nolasco:  10/26/2023   No future appointments.

## 2024-04-17 NOTE — TELEPHONE ENCOUNTER
Referral Details       Referred By   Referred To    Art Nolasco MD   [address] [id number]   Phone: [phone]   Fax: [phone]    Diagnoses: Personal history of skin cancer   History of melanoma   Order: External Referral To Dermatology   Reason: Specialty Services Required    Iveth Santo MD

## 2024-04-17 NOTE — TELEPHONE ENCOUNTER
Called pt regarding her referral.  Pt's plan does not require referrals.  Verified online and assisted pt to find no referral required marking on her insurance card.  Pt verbalized understanding and appreciation.  Pt was given incorrect information from insurance.  No further action required.    FALLON sent to pool.

## 2024-04-17 NOTE — TELEPHONE ENCOUNTER
----- Message from Ashlyn Wayne sent at 4/17/2024  2:20 PM EDT -----  Subject: Refill Request    QUESTIONS  Name of Medication? valACYclovir (VALTREX) 1 g tablet  Patient-reported dosage and instructions? Take one tablet by mouth once   daily  How many days do you have left? 5  Preferred Pharmacy? [id number]  Pharmacy phone number (if available)? [phone]  Additional Information for Provider? Patient is requesting a 90 day   supply.   ---------------------------------------------------------------------------  --------------  CALL BACK INFO  What is the best way for the office to contact you? OK to leave message on   voicemail  Preferred Call Back Phone Number? [phone]  ---------------------------------------------------------------------------  --------------  SCRIPT ANSWERS  Relationship to Patient? Self

## 2024-04-17 NOTE — TELEPHONE ENCOUNTER
----- Message from Ashlyn Wayne sent at 4/17/2024  2:18 PM EDT -----  Subject: Referral Request    Reason for referral request? Dermatology - History of Skin Cancer and   Melanoma   Provider patient wants to be referred to(if known):     Provider Phone Number(if known):[phone]    Additional Information for Provider? Patient has an appt at [facility] with Iveth Santo MD, on 04/23/24 at 1:50 pm and needs a   referral completed prior to. Please call patient to confirm referral has   been completed timely.  ---------------------------------------------------------------------------  --------------  CALL BACK INFO    [phone]; OK to leave message on voicemail  ---------------------------------------------------------------------------  --------------

## 2024-08-14 ENCOUNTER — OFFICE VISIT (OUTPATIENT)
Age: 79
End: 2024-08-14
Payer: MEDICARE

## 2024-08-14 VITALS
HEART RATE: 80 BPM | OXYGEN SATURATION: 97 % | DIASTOLIC BLOOD PRESSURE: 78 MMHG | WEIGHT: 133 LBS | RESPIRATION RATE: 15 BRPM | BODY MASS INDEX: 22.16 KG/M2 | HEIGHT: 65 IN | TEMPERATURE: 97.7 F | SYSTOLIC BLOOD PRESSURE: 128 MMHG

## 2024-08-14 DIAGNOSIS — T14.8XXA INFECTED SKIN TEAR: Primary | ICD-10-CM

## 2024-08-14 DIAGNOSIS — L08.9 INFECTED SKIN TEAR: Primary | ICD-10-CM

## 2024-08-14 PROCEDURE — 99213 OFFICE O/P EST LOW 20 MIN: CPT | Performed by: INTERNAL MEDICINE

## 2024-08-14 PROCEDURE — 1123F ACP DISCUSS/DSCN MKR DOCD: CPT | Performed by: INTERNAL MEDICINE

## 2024-08-14 RX ORDER — CEPHALEXIN 500 MG/1
500 CAPSULE ORAL 3 TIMES DAILY
Qty: 21 CAPSULE | Refills: 0 | Status: SHIPPED | OUTPATIENT
Start: 2024-08-14 | End: 2024-08-21

## 2024-08-14 SDOH — ECONOMIC STABILITY: INCOME INSECURITY: HOW HARD IS IT FOR YOU TO PAY FOR THE VERY BASICS LIKE FOOD, HOUSING, MEDICAL CARE, AND HEATING?: NOT HARD AT ALL

## 2024-08-14 SDOH — ECONOMIC STABILITY: FOOD INSECURITY: WITHIN THE PAST 12 MONTHS, YOU WORRIED THAT YOUR FOOD WOULD RUN OUT BEFORE YOU GOT MONEY TO BUY MORE.: NEVER TRUE

## 2024-08-14 SDOH — ECONOMIC STABILITY: FOOD INSECURITY: WITHIN THE PAST 12 MONTHS, THE FOOD YOU BOUGHT JUST DIDN'T LAST AND YOU DIDN'T HAVE MONEY TO GET MORE.: NEVER TRUE

## 2024-08-14 ASSESSMENT — PATIENT HEALTH QUESTIONNAIRE - PHQ9
2. FEELING DOWN, DEPRESSED OR HOPELESS: NOT AT ALL
SUM OF ALL RESPONSES TO PHQ QUESTIONS 1-9: 0
SUM OF ALL RESPONSES TO PHQ QUESTIONS 1-9: 0
1. LITTLE INTEREST OR PLEASURE IN DOING THINGS: NOT AT ALL
SUM OF ALL RESPONSES TO PHQ QUESTIONS 1-9: 0
SUM OF ALL RESPONSES TO PHQ QUESTIONS 1-9: 0
SUM OF ALL RESPONSES TO PHQ9 QUESTIONS 1 & 2: 0

## 2024-08-14 NOTE — PATIENT INSTRUCTIONS
Please wash skin tear with DIAL soap daily and use small amount of neosporin on the cut area only. Cover with the nonstick dressing and ACE bandage for daytime use.

## 2024-08-14 NOTE — PROGRESS NOTES
HPI:  Carmela Chagn is a 79 y.o. year old female who is here for an injury to the left anterior shin that occurred 10 days ago.  She apparently lost her balance going up the steps and when she did had a fall into the door jam.  She suffered a hematoma and 2 skin tears on the left anterior shin.  Initially they were better and now she has had more swelling, redness, and some burning sensation.  No fevers, chills, or sweats.  No nausea or vomiting.      Past Medical History:   Diagnosis Date    Arthritis     osteo    Autoimmune disease (HCC) 2003 or so    sjogren's syndrome    Chronic pain     Endometriosis     HSV-2 infection     Osteoarthritis     Skin cancer     melanoma x 4       Past Surgical History:   Procedure Laterality Date    COLONOSCOPY  09/26/2017    10 yr f/u - Dr. Albright    EYE SURGERY  cataracts    HEENT  cataract 2020    HYSTERECTOMY (CERVIX STATUS UNKNOWN)      HYSTERECTOMY, VAGINAL      MANIPULATN SHTEOR JT W ANESTHESIA      MOHS SURGERY  10/2020    ORTHOPEDIC SURGERY  10/2012    Foot surgery    ORTHOPEDIC SURGERY      FROZEN SHOULDER    XR DEXA BONE DENSITY STUDY AXIAL  04/2009       Prior to Admission medications    Medication Sig Start Date End Date Taking? Authorizing Provider   cephALEXin (KEFLEX) 500 MG capsule Take 1 capsule by mouth 3 times daily for 7 days 8/14/24 8/21/24 Yes Art Nolasco MD   valACYclovir (VALTREX) 1 g tablet Take 1 tablet by mouth daily  Patient taking differently: Take 0.5 tablets by mouth daily 4/17/24  Yes Art Nolasco MD   MAGNESIUM CITRATE PO Take by mouth   Yes Automatic Reconciliation, Ar   vitamin D 25 MCG (1000 UT) CAPS Take by mouth   Yes Automatic Reconciliation, Ar   fexofenadine (ALLEGRA) 180 MG tablet Take 1 tablet by mouth daily 7/13/11  Yes Automatic Reconciliation, Ar   metroNIDAZOLE (METROCREAM) 0.75 % cream Apply topically 2 times daily   Yes Automatic Reconciliation, Ar   potassium gluconate 550 mg tablet Take 99 mg by mouth daily   Yes

## 2024-08-19 ENCOUNTER — TELEPHONE (OUTPATIENT)
Age: 79
End: 2024-08-19

## 2024-08-19 NOTE — TELEPHONE ENCOUNTER
Pt states she was prescribed keflex for leg wound, she tolerated the first several days OK, last night she took magnesium citrate pill for her leg cramps she gets at night, she had about 1-2 hours of stomach cramping and diarrhea which has now resolved, she is unsure if she should continue rx d/t diarrhea and also notes her leg has not shown much improvement, slight swelling and redness and burning sensation has returned.  She wants to know if she should take a stronger abx?

## 2024-08-19 NOTE — TELEPHONE ENCOUNTER
Patient has been taking the   cephALEXin (KEFLEX) 500 MG capsules for her leg infection.  Had been tolerating it until last night, had bad diarrhea -- but is not sure if it was from also taking magnesium, which she was already on.  Also, is not sure if she should still have the amount of redness she has.  Patient is due to take the antibiotic now, if she could please hear back soon, would appreciate it.

## 2024-08-19 NOTE — TELEPHONE ENCOUNTER
Spoke w/ pt and notified per Dr. Nolasco to hold magnesium while on abx, advised pt to take med w/ food, elevate leg and apply warm compresses, pt voiced understanding.

## 2024-10-26 SDOH — HEALTH STABILITY: PHYSICAL HEALTH: ON AVERAGE, HOW MANY MINUTES DO YOU ENGAGE IN EXERCISE AT THIS LEVEL?: 0 MIN

## 2024-10-26 SDOH — HEALTH STABILITY: PHYSICAL HEALTH: ON AVERAGE, HOW MANY DAYS PER WEEK DO YOU ENGAGE IN MODERATE TO STRENUOUS EXERCISE (LIKE A BRISK WALK)?: 0 DAYS

## 2024-10-26 ASSESSMENT — PATIENT HEALTH QUESTIONNAIRE - PHQ9
SUM OF ALL RESPONSES TO PHQ QUESTIONS 1-9: 0
2. FEELING DOWN, DEPRESSED OR HOPELESS: NOT AT ALL
1. LITTLE INTEREST OR PLEASURE IN DOING THINGS: NOT AT ALL
SUM OF ALL RESPONSES TO PHQ QUESTIONS 1-9: 0
SUM OF ALL RESPONSES TO PHQ9 QUESTIONS 1 & 2: 0

## 2024-10-26 ASSESSMENT — LIFESTYLE VARIABLES
HOW OFTEN DO YOU HAVE SIX OR MORE DRINKS ON ONE OCCASION: 1
HOW OFTEN DO YOU HAVE A DRINK CONTAINING ALCOHOL: 1
HOW MANY STANDARD DRINKS CONTAINING ALCOHOL DO YOU HAVE ON A TYPICAL DAY: 0
HOW MANY STANDARD DRINKS CONTAINING ALCOHOL DO YOU HAVE ON A TYPICAL DAY: PATIENT DOES NOT DRINK
HOW OFTEN DO YOU HAVE A DRINK CONTAINING ALCOHOL: NEVER

## 2024-10-29 ENCOUNTER — OFFICE VISIT (OUTPATIENT)
Age: 79
End: 2024-10-29
Payer: MEDICARE

## 2024-10-29 VITALS
TEMPERATURE: 98.6 F | DIASTOLIC BLOOD PRESSURE: 77 MMHG | WEIGHT: 131.4 LBS | RESPIRATION RATE: 15 BRPM | OXYGEN SATURATION: 96 % | HEART RATE: 86 BPM | HEIGHT: 65 IN | BODY MASS INDEX: 21.89 KG/M2 | SYSTOLIC BLOOD PRESSURE: 133 MMHG

## 2024-10-29 DIAGNOSIS — E78.2 MIXED HYPERLIPIDEMIA: ICD-10-CM

## 2024-10-29 DIAGNOSIS — E55.9 VITAMIN D DEFICIENCY: ICD-10-CM

## 2024-10-29 DIAGNOSIS — M16.0 BILATERAL HIP JOINT ARTHRITIS: ICD-10-CM

## 2024-10-29 DIAGNOSIS — C43.61 MALIGNANT MELANOMA OF RIGHT UPPER LIMB, INCLUDING SHOULDER (HCC): ICD-10-CM

## 2024-10-29 DIAGNOSIS — R79.9 ABNORMAL FINDING OF BLOOD CHEMISTRY, UNSPECIFIED: ICD-10-CM

## 2024-10-29 DIAGNOSIS — Z00.00 MEDICARE ANNUAL WELLNESS VISIT, SUBSEQUENT: Primary | ICD-10-CM

## 2024-10-29 DIAGNOSIS — M79.672 PAIN IN LEFT FOOT: ICD-10-CM

## 2024-10-29 DIAGNOSIS — G62.9 NEUROPATHY: ICD-10-CM

## 2024-10-29 PROCEDURE — 99214 OFFICE O/P EST MOD 30 MIN: CPT | Performed by: INTERNAL MEDICINE

## 2024-10-29 NOTE — PROGRESS NOTES
Medicare Annual Wellness Visit    Carmela Chang is here for Medicare AWV    Assessment & Plan   Medicare annual wellness visit, subsequent  Mixed hyperlipidemia-diet controlled.  Check labs to be sure that is adequate.  -     Comprehensive Metabolic Panel; Future  -     CBC with Auto Differential; Future  -     Lipid Panel; Future  -     TSH; Future  -     Hemoglobin A1C; Future  Malignant melanoma of right upper limb, including shoulder (HCC)-followed by dermatology with appointments every 4 months.  -     Hemoglobin A1C; Future  Vitamin D deficiency-repleted on recent labs.  -     Hemoglobin A1C; Future  Neuropathy-new diagnosis.  Has improved somewhat with B12 supplementation.  Will check labs to rule out primary causes and consider Cymbalta as an option if symptoms persist.  -     Vitamin B12; Future  -     Hemoglobin A1C; Future  -     Electrophoresis Protein, Serum; Future  Pain in left foot-per above.  -     Hemoglobin A1C; Future  Abnormal finding of blood chemistry, unspecified  -     Hemoglobin A1C; Future  Bilateral hip joint arthritis-discussed surgical options today she wishes to defer for now and continue conservative approach with analgesics as needed.  Recommendations for Preventive Services Due: see orders and patient instructions/AVS.  Recommended screening schedule for the next 5-10 years is provided to the patient in written form: see Patient Instructions/AVS.     Return in 1 year (on 10/29/2025) for Medicare AWV.     Subjective   Presents for a wellness exam as well as a follow-up.  She has recently had increased neuropathic pain in both feet.  They are burning and stinging regularly.  B vitamin supplements seem to have helped.  She does have ongoing pain in both of her hips as well as both knees.    Patient's complete Health Risk Assessment and screening values have been reviewed and are found in Flowsheets. The following problems were reviewed today and where indicated follow up

## 2024-10-29 NOTE — PATIENT INSTRUCTIONS
doctor about stop-smoking programs and medicines. These can increase your chances of quitting for good. Quitting is one of the most important things you can do to protect your heart. It is never too late to quit. Try to avoid secondhand smoke too.     Stay at a weight that's healthy for you. Talk to your doctor if you need help losing weight.     Try to get 7 to 9 hours of sleep each night.     Limit alcohol to 2 drinks a day for men and 1 drink a day for women. Too much alcohol can cause health problems.     Manage other health problems such as diabetes, high blood pressure, and high cholesterol. If you think you may have a problem with alcohol or drug use, talk to your doctor.   Medicines    Take your medicines exactly as prescribed. Call your doctor if you think you are having a problem with your medicine.     If your doctor recommends aspirin, take the amount directed each day. Make sure you take aspirin and not another kind of pain reliever, such as acetaminophen (Tylenol).   When should you call for help?   Call 911 if you have symptoms of a heart attack. These may include:    Chest pain or pressure, or a strange feeling in the chest.     Sweating.     Shortness of breath.     Pain, pressure, or a strange feeling in the back, neck, jaw, or upper belly or in one or both shoulders or arms.     Lightheadedness or sudden weakness.     A fast or irregular heartbeat.   After you call 911, the  may tell you to chew 1 adult-strength or 2 to 4 low-dose aspirin. Wait for an ambulance. Do not try to drive yourself.  Watch closely for changes in your health, and be sure to contact your doctor if you have any problems.  Where can you learn more?  Go to https://www.[employer].net/patientEd and enter F075 to learn more about \"A Healthy Heart: Care Instructions.\"  Current as of: June 24, 2023  Content Version: 14.2  © 2024 [employer]   Care instructions adapted under license by [employer]. If you have

## 2024-10-30 LAB
ALBUMIN SERPL-MCNC: 3.7 G/DL (ref 3.5–5)
ALBUMIN/GLOB SERPL: 1.1 (ref 1.1–2.2)
ALP SERPL-CCNC: 114 U/L (ref 45–117)
ALT SERPL-CCNC: 38 U/L (ref 12–78)
ANION GAP SERPL CALC-SCNC: 5 MMOL/L (ref 2–12)
AST SERPL-CCNC: 29 U/L (ref 15–37)
BASOPHILS # BLD: 0.1 K/UL (ref 0–0.1)
BASOPHILS NFR BLD: 1 % (ref 0–1)
BILIRUB SERPL-MCNC: 0.4 MG/DL (ref 0.2–1)
BUN SERPL-MCNC: 15 MG/DL (ref 6–20)
BUN/CREAT SERPL: 17 (ref 12–20)
CALCIUM SERPL-MCNC: 9.2 MG/DL (ref 8.5–10.1)
CHLORIDE SERPL-SCNC: 106 MMOL/L (ref 97–108)
CHOLEST SERPL-MCNC: 235 MG/DL
CO2 SERPL-SCNC: 29 MMOL/L (ref 21–32)
CREAT SERPL-MCNC: 0.86 MG/DL (ref 0.55–1.02)
DIFFERENTIAL METHOD BLD: NORMAL
EOSINOPHIL # BLD: 0.1 K/UL (ref 0–0.4)
EOSINOPHIL NFR BLD: 1 % (ref 0–7)
ERYTHROCYTE [DISTWIDTH] IN BLOOD BY AUTOMATED COUNT: 11.7 % (ref 11.5–14.5)
EST. AVERAGE GLUCOSE BLD GHB EST-MCNC: 105 MG/DL
GLOBULIN SER CALC-MCNC: 3.4 G/DL (ref 2–4)
GLUCOSE SERPL-MCNC: 96 MG/DL (ref 65–100)
HBA1C MFR BLD: 5.3 % (ref 4–5.6)
HCT VFR BLD AUTO: 41.4 % (ref 35–47)
HDLC SERPL-MCNC: 64 MG/DL
HDLC SERPL: 3.7 (ref 0–5)
HGB BLD-MCNC: 13.9 G/DL (ref 11.5–16)
IMM GRANULOCYTES # BLD AUTO: 0 K/UL (ref 0–0.04)
IMM GRANULOCYTES NFR BLD AUTO: 0 % (ref 0–0.5)
LDLC SERPL CALC-MCNC: 127.6 MG/DL (ref 0–100)
LYMPHOCYTES # BLD: 2.9 K/UL (ref 0.8–3.5)
LYMPHOCYTES NFR BLD: 39 % (ref 12–49)
MCH RBC QN AUTO: 32 PG (ref 26–34)
MCHC RBC AUTO-ENTMCNC: 33.6 G/DL (ref 30–36.5)
MCV RBC AUTO: 95.2 FL (ref 80–99)
MONOCYTES # BLD: 0.5 K/UL (ref 0–1)
MONOCYTES NFR BLD: 6 % (ref 5–13)
NEUTS SEG # BLD: 4.1 K/UL (ref 1.8–8)
NEUTS SEG NFR BLD: 53 % (ref 32–75)
NRBC # BLD: 0 K/UL (ref 0–0.01)
NRBC BLD-RTO: 0 PER 100 WBC
PLATELET # BLD AUTO: 259 K/UL (ref 150–400)
PMV BLD AUTO: 9.9 FL (ref 8.9–12.9)
POTASSIUM SERPL-SCNC: 4.3 MMOL/L (ref 3.5–5.1)
PROT SERPL-MCNC: 7.1 G/DL (ref 6.4–8.2)
RBC # BLD AUTO: 4.35 M/UL (ref 3.8–5.2)
SODIUM SERPL-SCNC: 140 MMOL/L (ref 136–145)
TRIGL SERPL-MCNC: 217 MG/DL
TSH SERPL DL<=0.05 MIU/L-ACNC: 1.46 UIU/ML (ref 0.36–3.74)
VIT B12 SERPL-MCNC: 313 PG/ML (ref 193–986)
VLDLC SERPL CALC-MCNC: 43.4 MG/DL
WBC # BLD AUTO: 7.6 K/UL (ref 3.6–11)

## 2024-11-01 LAB
ALBUMIN SERPL ELPH-MCNC: 3.7 G/DL (ref 2.9–4.4)
ALBUMIN/GLOB SERPL: 1.2 (ref 0.7–1.7)
ALPHA1 GLOB SERPL ELPH-MCNC: 0.2 G/DL (ref 0–0.4)
ALPHA2 GLOB SERPL ELPH-MCNC: 0.6 G/DL (ref 0.4–1)
B-GLOBULIN SERPL ELPH-MCNC: 1.2 G/DL (ref 0.7–1.3)
GAMMA GLOB SERPL ELPH-MCNC: 1.2 G/DL (ref 0.4–1.8)
GLOBULIN SER CALC-MCNC: 3.2 G/DL (ref 2.2–3.9)
M PROTEIN SERPL ELPH-MCNC: 0.5 G/DL
PROT SERPL-MCNC: 6.9 G/DL (ref 6–8.5)

## 2024-11-06 RX ORDER — VALACYCLOVIR HYDROCHLORIDE 1 G/1
1000 TABLET, FILM COATED ORAL DAILY
Qty: 90 TABLET | Refills: 0 | Status: SHIPPED | OUTPATIENT
Start: 2024-11-06

## 2024-11-06 NOTE — TELEPHONE ENCOUNTER
Chief Complaint   Patient presents with    Medication Refill     Last Appointment with Dr. Art Nolasco:  10/29/2024   Future Appointments   Date Time Provider Department Center   11/3/2025  2:00 PM Art Nolasco MD [facility] DEP